# Patient Record
Sex: FEMALE | Race: WHITE | NOT HISPANIC OR LATINO | Employment: UNEMPLOYED | URBAN - METROPOLITAN AREA
[De-identification: names, ages, dates, MRNs, and addresses within clinical notes are randomized per-mention and may not be internally consistent; named-entity substitution may affect disease eponyms.]

---

## 2017-03-22 ENCOUNTER — HOSPITAL ENCOUNTER (EMERGENCY)
Facility: OTHER | Age: 27
Discharge: HOME | End: 2017-03-22
Attending: EMERGENCY MEDICINE
Payer: MEDICAID

## 2017-03-22 VITALS
BODY MASS INDEX: 30.73 KG/M2 | TEMPERATURE: 98 F | OXYGEN SATURATION: 98 % | WEIGHT: 180 LBS | HEIGHT: 64 IN | HEART RATE: 70 BPM | DIASTOLIC BLOOD PRESSURE: 74 MMHG | SYSTOLIC BLOOD PRESSURE: 125 MMHG | RESPIRATION RATE: 18 BRPM
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
## 2017-03-22 DIAGNOSIS — S63.619A FINGER SPRAIN, INITIAL ENCOUNTER: Primary | ICD-10-CM

## 2017-03-22 PROCEDURE — 99283 EMERGENCY DEPT VISIT LOW MDM: CPT | Mod: 25

## 2017-03-22 PROCEDURE — 29130 APPL FINGER SPLINT STATIC: CPT | Mod: F3

## 2017-03-22 RX ORDER — TRAMADOL HYDROCHLORIDE 50 MG/1
50 TABLET ORAL EVERY 6 HOURS PRN
Qty: 12 TABLET | Refills: 0 | Status: SHIPPED | OUTPATIENT
Start: 2017-03-22 | End: 2017-04-01

## 2017-03-22 NOTE — DISCHARGE INSTRUCTIONS
Finger Sprain  A sprain is a stretching or tearing of the ligaments that hold a joint together. There are no broken bones. Sprains take 3 to 6 weeks to heal.    A sprained finger may be treated with a splint or buddy tape. This is when you tape the injured finger to the one next to it for support. Minor sprains may require no additional support.  Home care  · Keep your hand elevated to reduce pain and swelling. This is very important during the first 48 hours.  · Apply an ice pack over the injured area for 15 to 20 minutes every 3 to 6 hours. You should do this for the first 24 to 48 hours. You can make an ice pack by filling a plastic bag that seals at the top with ice cubes and then wrapping it with a thin towel. Continue the use of ice packs for relief of pain and swelling as needed. As the ice melts, be careful to avoid getting any wrap or splint wet. After 48 hours, apply heat (warm shower or warm bath) for 15 to 20 minutes several times a day, or alternate ice and heat.  · If buddy tape was applied and it becomes wet or dirty, change it. You may replace it with paper, plastic or cloth tape. Cloth tape and paper tapes must be kept dry. Apply gauze or cotton padding between the fingers, especially at the webbed space. This will help prevent the skin from getting moist and breaking down. Keep the buddy tape in place for at least 4 weeks, or as instructed by your healthcare provider.  · If a splint was applied, wear it for the time advised.  · You may use over-the-counter pain medicine to control pain, unless another pain medicine was prescribed. If you have chronic liver or kidney disease or ever had a stomach ulcer or GI bleeding, talk with your healthcare provider before using these medicines.  Follow-up care  Follow up with your healthcare provider as directed. Finger joints will become stiff if immobile for too long. If a splint was applied, ask your healthcare provider when it is safe to begin  range-of-motion exercises.  Sometimes fractures dont show up on the first X-ray. Bruises and sprains can sometimes hurt as much as a fracture. These injuries can take time to heal completely. If your symptoms dont improve or they get worse, talk with your healthcare provider. You may need a repeat X-ray. If X-rays were taken, you will be told of any new findings that may affect your care.  When to seek medical advice  Call your healthcare provider right away if any of these occur:  · Pain or swelling increases  · Fingers or hand becomes cold, blue, numb, or tingly  Date Last Reviewed: 11/20/2015  © 0853-0922 [employer]. [address] [place] [address], [place] [address]. All rights reserved. This information is not intended as a substitute for professional medical care. Always follow your healthcare professional's instructions.

## 2017-03-22 NOTE — ED NOTES
Finger splint applied per MD order. Application and removal of device reviewed. Return demonstration provided by patient

## 2017-03-22 NOTE — ED PROVIDER NOTES
Encounter Date: 3/22/2017       History     Chief Complaint   Patient presents with    Hand Pain     left 4th finger injury, slipped in shower. swelling noted at knuckle area     Review of patient's allergies indicates:   Allergen Reactions    Sulfa (sulfonamide antibiotics) Anaphylaxis and Swelling     Patient is a 27 y.o. female presenting with the following complaint: hand injury.   Hand Injury    The incident occurred today. The incident occurred at home. The injury mechanism was a fall and a direct blow. The pain is present in the left fingers. The pain has been constant since the incident. Pertinent negatives include no fever. The symptoms are aggravated by movement, use and palpation. She has tried nothing for the symptoms.   right hand dominant  Past Medical History:   Diagnosis Date    Bipolar disorder     Bronchitis, chronic      Past Surgical History:   Procedure Laterality Date    rectocele       Family History   Problem Relation Age of Onset    Hypertension Mother     Liver disease Father     Breast cancer Neg Hx     Colon cancer Neg Hx     Ovarian cancer Neg Hx      Social History   Substance Use Topics    Smoking status: Former Smoker     Packs/day: 0.50     Years: 4.00     Types: Cigarettes    Smokeless tobacco: None    Alcohol use 0.5 oz/week     1 Standard drinks or equivalent per week      Comment: occasional     Review of Systems   Constitutional: Negative for chills and fever.   HENT: Negative.    Eyes: Negative.    Respiratory: Negative for cough, shortness of breath and stridor.    Cardiovascular: Negative for chest pain and palpitations.   Gastrointestinal: Negative for nausea and vomiting.   Genitourinary: Negative for dysuria and hematuria.   Musculoskeletal: Positive for arthralgias and joint swelling.   Skin: Negative.    Neurological: Negative for dizziness and headaches.   Psychiatric/Behavioral: Negative.    All other systems reviewed and are negative.      Physical Exam    Initial Vitals   BP Pulse Resp Temp SpO2   03/22/17 0018 03/22/17 0018 03/22/17 0018 03/22/17 0018 03/22/17 0018   136/77 74 18 97.6 °F (36.4 °C) 100 %     Physical Exam    Nursing note and vitals reviewed.  Constitutional: She appears well-developed and well-nourished. She is not diaphoretic. No distress.   HENT:   Head: Normocephalic and atraumatic.   Mouth/Throat: Oropharynx is clear and moist. No oropharyngeal exudate.   Eyes: EOM are normal. Pupils are equal, round, and reactive to light.   Neck: Normal range of motion. Neck supple.   Cardiovascular: Normal rate, regular rhythm and intact distal pulses.   No murmur heard.  Pulmonary/Chest: Breath sounds normal. No stridor. No respiratory distress.   Abdominal: Soft. Bowel sounds are normal. There is no tenderness.   Musculoskeletal: Normal range of motion. She exhibits edema and tenderness.        Left hand: She exhibits tenderness, bony tenderness and swelling. Normal sensation noted. Normal strength noted.        Hands:  Neurological: She is alert and oriented to person, place, and time. She has normal strength. No cranial nerve deficit.   Skin: Skin is warm and dry. No erythema. No pallor.   Psychiatric: She has a normal mood and affect.         ED Course   Procedures  Labs Reviewed - No data to display                            ED Course      Imaging Reviewed    Imaging Results         X-Ray Finger 2 or More Views (Final result) Result time:  03/22/17 00:47:53    Final result by Interface, Rad Results In (03/22/17 00:47:53)    Narrative:    Study Desc:   XR FINGER 2 OR MORE VIEWS  Clinical History: Fall, pain 4th prox IP joint  EXAM: Left 4th digit  Xray     IMAGES:   3 views total     COMPARISONS: None     FINDINGS:  There is no fracture or dislocation appreciated.     IMPRESSION:  No acute pathology appreciated.  If ongoing clinical concern, follow up imaging   recommended.     SL: 24 Signed by: Nicolas Sigala MD.  2017-03-22 00:47:50               Medications given in ED    Medications - No data to display    Discharge Medications     Medication List with Changes/Refills   New Medications    TRAMADOL (ULTRAM) 50 MG TABLET    Take 1 tablet (50 mg total) by mouth every 6 (six) hours as needed for Pain (severe pain).   Current Medications    BUTALBITAL-ACETAMINOPHEN-CAFFEINE -40 MG (FIORICET, ESGIC) -40 MG PER TABLET    TAKE 1 TABLET BY MOUTH TWICE DAILY AS NEEDED    MIRENA 20 MCG/24 HR (5 YEARS) IUD        PANTOPRAZOLE (PROTONIX) 40 MG TABLET    Take 1 tablet (40 mg total) by mouth once daily.    PRENATAL VIT#103-IRON-FA () 27 MG IRON- 1 MG TAB    Take 1 tablet by mouth once daily.    QUETIAPINE (SEROQUEL) 25 MG TAB    TAKE 1 TABLET BY MOUTH EVERY DAY    SERTRALINE (ZOLOFT) 50 MG TABLET    TAKE 1 TABLET BY MOUTH EVERY DAY   Discontinued Medications    ONDANSETRON (ZOFRAN-ODT) 4 MG TBDL    Take 1 tablet (4 mg total) by mouth every 8 (eight) hours as needed (nausea/vomiting).    PROMETHAZINE (PHENERGAN) 25 MG SUPPOSITORY    Place 1 suppository (25 mg total) rectally every 6 (six) hours as needed for Nausea.             Patient discharged to home in stable condition with instructions to:   1. Please take all meds as prescribed.  2. Follow-up with your primary care doctor   3. Return precautions discussed and patient and/or family/caretaker understands to return to the emergency room for any concerns including worsening of your current symptoms, fever, chills, night sweats, worsening pain, chest pain, shortness of breath, nausea, vomiting, diarrhea, bleeding, headache, difficulty talking, visual disturbances, weakness, numbness or any other acute concerns    Clinical Impression:   The primary encounter diagnosis was Finger sprain, initial encounter. A diagnosis of Fall was also pertinent to this visit.          Blanca Reyna MD  03/22/17 [id number]

## 2017-03-22 NOTE — ED AVS SNAPSHOT
[facility] EMERGENCY DEPARTMENT  [address] [place] [address]               Marly VALLES Head   3/22/2017  1:17 AM   ED    Description:  Female : 1990   Department:  [facility] Emergency Department           Your Care was Coordinated By:     Provider Role From To    Blanca Reyna MD Attending Provider 17 0128 --      Reason for Visit     Hand Pain           Diagnoses this Visit        Comments    Finger sprain, initial encounter    -  Primary     Fall           ED Disposition     ED Disposition Condition Comment    Discharge  Patient discharged to home in stable condition.              To Do List           Follow-up Information     Follow up with Leesa Mccarty MD.    Specialty:  Family Medicine    Why:  As needed, for ongoing care    Contact information:    [address]  [place] [address]  [phone]         These Medications        Disp Refills Start End    tramadol (ULTRAM) 50 mg tablet 12 tablet 0 3/22/2017 2017    Take 1 tablet (50 mg total) by mouth every 6 (six) hours as needed for Pain (severe pain). - Oral    [facility] [address] [place] [address] EXP AT [facility] Ph #: [phone]         [facility] On Call     [facility] On Call Nurse Care Line -  Assistance  Registered nurses in the [facility] provide clinical advisement, health education, appointment booking, and other advisory services.  Call for this free service at [phone].             Medications           Message regarding Medications     Verify the changes and/or additions to your medication regime listed below are the same as discussed with your clinician today.  If any of these changes or additions are incorrect, please notify your healthcare provider.        START taking these NEW medications        Refills    tramadol (ULTRAM) 50 mg tablet 0    Sig: Take 1 tablet (50 mg total) by mouth every 6 (six) hours as needed for Pain (severe pain).     "Class: Print    Route: Oral      STOP taking these medications     promethazine (PHENERGAN) 25 MG suppository Place 1 suppository (25 mg total) rectally every 6 (six) hours as needed for Nausea.    ondansetron (ZOFRAN-ODT) 4 MG TbDL Take 1 tablet (4 mg total) by mouth every 8 (eight) hours as needed (nausea/vomiting).           Verify that the below list of medications is an accurate representation of the medications you are currently taking.  If none reported, the list may be blank. If incorrect, please contact your healthcare provider. Carry this list with you in case of emergency.           Current Medications     butalbital-acetaminophen-caffeine -40 mg (FIORICET, ESGIC) -40 mg per tablet TAKE 1 TABLET BY MOUTH TWICE DAILY AS NEEDED    MIRENA 20 mcg/24 hr (5 years) IUD     pantoprazole (PROTONIX) 40 MG tablet Take 1 tablet (40 mg total) by mouth once daily.    prenatal vit#103-iron-FA () 27 mg iron- 1 mg Tab Take 1 tablet by mouth once daily.    quetiapine (SEROQUEL) 25 MG Tab TAKE 1 TABLET BY MOUTH EVERY DAY    sertraline (ZOLOFT) 50 MG tablet TAKE 1 TABLET BY MOUTH EVERY DAY    tramadol (ULTRAM) 50 mg tablet Take 1 tablet (50 mg total) by mouth every 6 (six) hours as needed for Pain (severe pain).           Clinical Reference Information           Your Vitals Were     BP Pulse Temp Resp Height Weight    136/77 74 97.6 °F (36.4 °C) (Temporal) 18 5' 4" (1.626 m) 81.6 kg (180 lb)    SpO2 BMI             100% 30.9 kg/m2         Allergies as of 3/22/2017        Reactions    Sulfa (Sulfonamide Antibiotics) Anaphylaxis, Swelling      Immunizations Administered on Date of Encounter - 3/22/2017     None      ED Micro, Lab, POCT     None      ED Imaging Orders     Start Ordered       Status Ordering Provider    03/22/17 0022 03/22/17 0021  X-Ray Finger 2 or More Views  1 time imaging      Final result         Discharge Instructions         Finger Sprain  A sprain is a stretching or tearing of the " ligaments that hold a joint together. There are no broken bones. Sprains take 3 to 6 weeks to heal.    A sprained finger may be treated with a splint or buddy tape. This is when you tape the injured finger to the one next to it for support. Minor sprains may require no additional support.  Home care  · Keep your hand elevated to reduce pain and swelling. This is very important during the first 48 hours.  · Apply an ice pack over the injured area for 15 to 20 minutes every 3 to 6 hours. You should do this for the first 24 to 48 hours. You can make an ice pack by filling a plastic bag that seals at the top with ice cubes and then wrapping it with a thin towel. Continue the use of ice packs for relief of pain and swelling as needed. As the ice melts, be careful to avoid getting any wrap or splint wet. After 48 hours, apply heat (warm shower or warm bath) for 15 to 20 minutes several times a day, or alternate ice and heat.  · If buddy tape was applied and it becomes wet or dirty, change it. You may replace it with paper, plastic or cloth tape. Cloth tape and paper tapes must be kept dry. Apply gauze or cotton padding between the fingers, especially at the webbed space. This will help prevent the skin from getting moist and breaking down. Keep the buddy tape in place for at least 4 weeks, or as instructed by your healthcare provider.  · If a splint was applied, wear it for the time advised.  · You may use over-the-counter pain medicine to control pain, unless another pain medicine was prescribed. If you have chronic liver or kidney disease or ever had a stomach ulcer or GI bleeding, talk with your healthcare provider before using these medicines.  Follow-up care  Follow up with your healthcare provider as directed. Finger joints will become stiff if immobile for too long. If a splint was applied, ask your healthcare provider when it is safe to begin range-of-motion exercises.  Sometimes fractures dont show up on the  first X-ray. Bruises and sprains can sometimes hurt as much as a fracture. These injuries can take time to heal completely. If your symptoms dont improve or they get worse, talk with your healthcare provider. You may need a repeat X-ray. If X-rays were taken, you will be told of any new findings that may affect your care.  When to seek medical advice  Call your healthcare provider right away if any of these occur:  · Pain or swelling increases  · Fingers or hand becomes cold, blue, numb, or tingly  Date Last Reviewed: 11/20/2015  © 0898-0140 [employer]. [address] [place] [address]. All rights reserved. This information is not intended as a substitute for professional medical care. Always follow your healthcare professional's instructions.          [facility] Sign-Up     Activating your [facility] account is as easy as 1-2-3!     1) Visit [email or web address], select Sign Up Now, enter this activation code and your date of birth, then select Next.  [email or web address]  Expires: 5/6/2017  3:03 AM      2) Create a username and password to use when you visit [facility] in the future and select a security question in case you lose your password and select Next.    3) Enter your e-mail address and click Sign Up!    Additional Information  If you have questions, please e-mail [email or web address] or call [phone] to talk to our [facility] staff. Remember, [facility] is NOT to be used for urgent needs. For medical emergencies, dial 911.         Smoking Cessation     If you would like to quit smoking:   You may be eligible for free services if you are a [place] resident and started smoking cigarettes before September 1, 1988.  Call the Smoking Cessation Trust (SCT) toll free at (956) 202-8245 or (800) 541-1095.   Call 9-303-QUIT-NOW if you do not meet the above criteria.             [facility] Emergency Department complies with applicable Federal civil rights laws and does not discriminate on the  basis of race, color, national origin, age, disability, or sex.        Language Assistance Services     ATTENTION: Language assistance services are available, free of charge. Please call [phone].      ATENCIÓN: Si habla baljeet, tiene a wills disposición servicios gratuitos de asistencia lingüística. Llame al [phone].     CHÚ Ý: N?u b?n nói Ti?ng Vi?t, có các d?ch v? h? tr? ngôn ng? mi?n phí dành cho b?n. G?i s? [phone].

## 2017-08-14 ENCOUNTER — TELEPHONE (OUTPATIENT)
Dept: FAMILY MEDICINE | Facility: CLINIC | Age: 27
End: 2017-08-14

## 2017-08-14 NOTE — TELEPHONE ENCOUNTER
----- Message from Renae Delgado sent at 8/11/2017  3:44 PM CDT -----  Contact: Patient  x _1st Request  _  2nd Request  _  3rd Request    Who:HEAD,KATE VALLES [[id number]]    Why:Patient states she was calling to request a prescription for some eye cream she has a [facility] on [address] in [place]     What Number to Call Back:[phone]    When to Expect a call back: (Before the end of the day)   -- if call after 3:00 call back will be tomorrow.

## 2017-10-23 ENCOUNTER — TELEPHONE (OUTPATIENT)
Dept: FAMILY MEDICINE | Facility: CLINIC | Age: 27
End: 2017-10-23

## 2017-12-27 ENCOUNTER — TELEPHONE (OUTPATIENT)
Dept: FAMILY MEDICINE | Facility: CLINIC | Age: 27
End: 2017-12-27

## 2017-12-27 NOTE — TELEPHONE ENCOUNTER
----- Message from Sharon Ramesh sent at 12/26/2017 12:15 PM CST -----  Contact: self  Patient requests to receive a referral for mental health doctor. Patient also requests for MRI orders for breast at [facility] in [place], as her left breast pain from breast implants. She can be reached at [phone]. Thank you!

## 2018-01-01 NOTE — TELEPHONE ENCOUNTER
----- Message from Andie Morel sent at 10/23/2017 10:38 AM CDT -----  Contact: self  Pt calling to see if she can get a script for her steib. Please call [phone].  
----- Message from Andie Morel sent at 10/23/2017 10:38 AM CDT -----  Contact: self  Pt calling to see if she can get a script for her steib. Please call [phone].  
Left message  
Left message to call office.  
Patient advised, said she will call back to schedule appt.  
Patient states that she has a stye and it is really big and she has no way of coming in until next week and that is really bothering her. Requesting a cream or drops. Thank you  
She should use warm compresses.  She can also go to an urgent care to be seen.  Otherwise she needs an office visit.   
28

## 2018-01-09 DIAGNOSIS — F41.9 ANXIETY: ICD-10-CM

## 2018-01-09 RX ORDER — SERTRALINE HYDROCHLORIDE 50 MG/1
50 TABLET, FILM COATED ORAL DAILY
Qty: 30 TABLET | Refills: 0 | Status: SHIPPED | OUTPATIENT
Start: 2018-01-09 | End: 2018-01-16

## 2018-01-09 NOTE — TELEPHONE ENCOUNTER
----- Message from Sharon Ramesh sent at 1/9/2018 12:14 PM CST -----  Refill: sertraline (ZOLOFT) 50 MG tablet    [facility] [address] [place] [address] EXPY AT [facility]     Patient can be reached at [phone]. Thank you!

## 2018-01-16 ENCOUNTER — OFFICE VISIT (OUTPATIENT)
Dept: FAMILY MEDICINE | Facility: CLINIC | Age: 28
End: 2018-01-16
Payer: MEDICAID

## 2018-01-16 VITALS
TEMPERATURE: 99 F | DIASTOLIC BLOOD PRESSURE: 78 MMHG | WEIGHT: 176.13 LBS | SYSTOLIC BLOOD PRESSURE: 110 MMHG | BODY MASS INDEX: 30.07 KG/M2 | HEIGHT: 64 IN | OXYGEN SATURATION: 99 % | HEART RATE: 62 BPM

## 2018-01-16 DIAGNOSIS — F31.4 BIPOLAR DISORDER, CURRENT EPISODE DEPRESSED, SEVERE, WITHOUT PSYCHOTIC FEATURES: ICD-10-CM

## 2018-01-16 DIAGNOSIS — T85.43XA RUPTURED SILICONE BREAST IMPLANT, INITIAL ENCOUNTER: ICD-10-CM

## 2018-01-16 DIAGNOSIS — N64.4 BREAST PAIN: ICD-10-CM

## 2018-01-16 DIAGNOSIS — G43.719 INTRACTABLE CHRONIC MIGRAINE WITHOUT AURA AND WITHOUT STATUS MIGRAINOSUS: Primary | ICD-10-CM

## 2018-01-16 PROCEDURE — 99214 OFFICE O/P EST MOD 30 MIN: CPT | Mod: S$PBB,,, | Performed by: FAMILY MEDICINE

## 2018-01-16 PROCEDURE — 99999 PR PBB SHADOW E&M-EST. PATIENT-LVL IV: CPT | Mod: PBBFAC,,, | Performed by: FAMILY MEDICINE

## 2018-01-16 PROCEDURE — 99214 OFFICE O/P EST MOD 30 MIN: CPT | Mod: PBBFAC,PO | Performed by: FAMILY MEDICINE

## 2018-01-16 RX ORDER — SUMATRIPTAN SUCCINATE 25 MG/1
50 TABLET ORAL EVERY 8 HOURS PRN
Qty: 10 TABLET | Refills: 0 | Status: SHIPPED | OUTPATIENT
Start: 2018-01-16 | End: 2018-10-12

## 2018-01-16 RX ORDER — LEVONORGESTREL AND ETHINYL ESTRADIOL 0.15-0.03
1 KIT ORAL DAILY
COMMUNITY
End: 2018-10-12

## 2018-01-16 NOTE — PROGRESS NOTES
Office Visit    Patient Name: Marly VALLES Head    : 1990  MRN: [id number]    Subjective:  Marly VALLES is a 27 y.o. female who presents today for:    migraines and referral      This patient has multiple medical diagnoses as noted below.  This patient is known to me and to this clinic.   She reports that she is off of seroquel for sleep.  She would like a referral to [facility].        She reports increased breast pain.  Implants placed over a year ago.  Unsure the cause of the pain. No injury to her breast.       Patient Active Problem List   Diagnosis    Tobacco smoking complicating pregnancy    Upper abdominal pain       Past Surgical History:   Procedure Laterality Date    rectocele         Family History   Problem Relation Age of Onset    Hypertension Mother     Liver disease Father     Breast cancer Neg Hx     Colon cancer Neg Hx     Ovarian cancer Neg Hx        Social History     Social History    Marital status: Single     Spouse name: N/A    Number of children: N/A    Years of education: N/A     Occupational History    Not on file.     Social History Main Topics    Smoking status: Former Smoker     Packs/day: 0.50     Years: 4.00     Types: Cigarettes    Smokeless tobacco: Not on file    Alcohol use 0.5 oz/week     1 Standard drinks or equivalent per week      Comment: occasional    Drug use: No    Sexual activity: Yes     Partners: Male     Birth control/ protection: None     Other Topics Concern    Not on file     Social History Narrative    No narrative on file       Current Medications  Medications reviewed and updated.     Allergies   Review of patient's allergies indicates:   Allergen Reactions    Sulfa (sulfonamide antibiotics) Anaphylaxis and Swelling         Labs  No results found for: LABA1C, HGBA1C  Lab Results   Component Value Date     2016    K 4.5 2016     2016    CO2 27 2016    BUN 10 2016    CREATININE 0.7 2016    CALCIUM 9.1  "06/22/2016    ANIONGAP 10 06/22/2016    ESTGFRAFRICA >60 06/22/2016    EGFRNONAA >60 06/22/2016     No results found for: CHOL  No results found for: HDL  No results found for: LDLCALC  No results found for: TRIG  No results found for: CHOLHDL  Last set of blood work has been reviewed as noted above.    Review of Systems   Constitutional: Negative for activity change, appetite change, fatigue, fever and unexpected weight change.   HENT: Negative.  Negative for ear discharge, ear pain, rhinorrhea and sore throat.    Eyes: Negative.    Respiratory: Negative for apnea, cough, chest tightness, shortness of breath and wheezing.    Cardiovascular: Negative for chest pain, palpitations and leg swelling.   Gastrointestinal: Negative for abdominal distention, abdominal pain, constipation, diarrhea and vomiting.   Endocrine: Negative for cold intolerance, heat intolerance, polydipsia and polyuria.   Genitourinary: Negative for decreased urine volume, menstrual problem, urgency, vaginal bleeding, vaginal discharge and vaginal pain.   Musculoskeletal: Negative.    Skin: Negative for rash.   Neurological: Positive for headaches. Negative for dizziness.   Hematological: Does not bruise/bleed easily.   Psychiatric/Behavioral: Positive for agitation, decreased concentration and dysphoric mood. Negative for sleep disturbance and suicidal ideas. The patient is nervous/anxious.        /78 (BP Location: Left arm, Patient Position: Sitting, BP Method: Large (Manual))   Pulse 62   Temp 98.5 °F (36.9 °C) (Oral)   Ht 5' 4" (1.626 m)   Wt 79.9 kg (176 lb 2.4 oz)   SpO2 99%   BMI 30.24 kg/m²      Physical Exam   Constitutional: She is oriented to person, place, and time. She appears well-developed and well-nourished.   HENT:   Head: Normocephalic.   Right Ear: External ear normal.   Left Ear: External ear normal.   Nose: Nose normal.   Mouth/Throat: Oropharynx is clear and moist.   Eyes: Conjunctivae and EOM are normal. Pupils " are equal, round, and reactive to light.   Cardiovascular: Normal rate, regular rhythm and normal heart sounds.    Pulmonary/Chest: Effort normal and breath sounds normal.       Neurological: She is alert and oriented to person, place, and time.   Skin: Skin is warm and dry.   Vitals reviewed.      Health Maintenance  Health Maintenance       Date Due Completion Date    Lipid Panel 1990 ---    TETANUS VACCINE 03/03/2008 ---    Pap Smear 10/28/2014 10/28/2013    Influenza Vaccine 08/01/2017 11/4/2013          Assessment/Plan:  Marly Colmenares is a 27 y.o. female who presents today for :    1. Intractable chronic migraine without aura and without status migrainosus    2. Breast pain    3. Ruptured silicone breast implant, initial encounter    4. Bipolar disorder, current episode depressed, severe, without psychotic features        Problem List Items Addressed This Visit     None      Visit Diagnoses     Intractable chronic migraine without aura and without status migrainosus    -  Primary  -   imitrex sent to pharmacy     Breast pain      -  Will need further evaluation   -  Implant may be dislodged or ruptured       Ruptured silicone breast implant, initial encounter          Relevant Orders    MRI Breast Bilateral    Bipolar disorder, current episode depressed, severe, without psychotic features        Relevant Orders    Ambulatory referral to Psychiatry  -  Referral provided to patient in office           Follow-up in about 4 weeks (around 2/13/2018), or if symptoms worsen or fail to improve.     This note was created by combination of typed  and Dragon dictation.  Transcription errors may be present.  If there are any questions, please contact me.

## 2018-02-05 DIAGNOSIS — F41.9 ANXIETY: ICD-10-CM

## 2018-02-05 RX ORDER — SERTRALINE HYDROCHLORIDE 50 MG/1
TABLET, FILM COATED ORAL
Qty: 30 TABLET | Refills: 0 | Status: SHIPPED | OUTPATIENT
Start: 2018-02-05 | End: 2018-03-04 | Stop reason: SDUPTHER

## 2018-03-04 DIAGNOSIS — F41.9 ANXIETY: ICD-10-CM

## 2018-03-04 RX ORDER — SERTRALINE HYDROCHLORIDE 50 MG/1
TABLET, FILM COATED ORAL
Qty: 30 TABLET | Refills: 3 | Status: SHIPPED | OUTPATIENT
Start: 2018-03-04 | End: 2018-10-12

## 2018-04-01 DIAGNOSIS — F41.9 ANXIETY: ICD-10-CM

## 2018-04-02 RX ORDER — SERTRALINE HYDROCHLORIDE 50 MG/1
TABLET, FILM COATED ORAL
Qty: 30 TABLET | Refills: 0 | Status: SHIPPED | OUTPATIENT
Start: 2018-04-02 | End: 2018-07-24 | Stop reason: SDUPTHER

## 2018-04-02 RX ORDER — QUETIAPINE FUMARATE 25 MG/1
TABLET, FILM COATED ORAL
Qty: 30 TABLET | Refills: 0 | Status: SHIPPED | OUTPATIENT
Start: 2018-04-02 | End: 2018-04-29 | Stop reason: SDUPTHER

## 2018-04-29 DIAGNOSIS — F41.9 ANXIETY: ICD-10-CM

## 2018-04-30 RX ORDER — QUETIAPINE FUMARATE 25 MG/1
TABLET, FILM COATED ORAL
Qty: 30 TABLET | Refills: 0 | Status: SHIPPED | OUTPATIENT
Start: 2018-04-30 | End: 2018-05-29 | Stop reason: SDUPTHER

## 2018-05-29 DIAGNOSIS — F41.9 ANXIETY: ICD-10-CM

## 2018-05-29 RX ORDER — QUETIAPINE FUMARATE 25 MG/1
25 TABLET, FILM COATED ORAL DAILY
Qty: 30 TABLET | Refills: 0 | Status: SHIPPED | OUTPATIENT
Start: 2018-05-29 | End: 2018-06-27 | Stop reason: SDUPTHER

## 2018-06-27 DIAGNOSIS — F41.9 ANXIETY: ICD-10-CM

## 2018-06-27 RX ORDER — QUETIAPINE FUMARATE 25 MG/1
TABLET, FILM COATED ORAL
Qty: 30 TABLET | Refills: 2 | Status: SHIPPED | OUTPATIENT
Start: 2018-06-27 | End: 2018-09-18 | Stop reason: SDUPTHER

## 2018-07-24 DIAGNOSIS — F41.9 ANXIETY: ICD-10-CM

## 2018-07-24 RX ORDER — SERTRALINE HYDROCHLORIDE 50 MG/1
TABLET, FILM COATED ORAL
Qty: 30 TABLET | Refills: 3 | Status: SHIPPED | OUTPATIENT
Start: 2018-07-24 | End: 2018-10-12 | Stop reason: SDUPTHER

## 2018-07-24 NOTE — TELEPHONE ENCOUNTER
----- Message from Lilliana Tamayo sent at 7/24/2018 10:33 AM CDT -----  Contact: Self/ [phone]  Pt requesting a call back from staff to reschedule a later time today. Please call to advise. Thank you.

## 2018-07-24 NOTE — TELEPHONE ENCOUNTER
See message from pt regarding appt for this afternoon. Left message for pt to call appt desk to reschedule her appt if she will not be able to come this morning.

## 2018-07-26 ENCOUNTER — TELEPHONE (OUTPATIENT)
Dept: FAMILY MEDICINE | Facility: CLINIC | Age: 28
End: 2018-07-26

## 2018-07-26 NOTE — TELEPHONE ENCOUNTER
----- Message from Pamela Dozier sent at 7/25/2018  4:53 PM CDT -----  Contact: [phone]/Pt  Calling To speak with nurse regarding appt

## 2018-07-26 NOTE — TELEPHONE ENCOUNTER
Contacted patient, informed her we do not have any earlier appt. To see Dr. Gupta. Pt. Declined to see another provider. Pt. Kept appt scheduled for August.

## 2018-08-31 ENCOUNTER — TELEPHONE (OUTPATIENT)
Dept: FAMILY MEDICINE | Facility: CLINIC | Age: 28
End: 2018-08-31

## 2018-08-31 NOTE — TELEPHONE ENCOUNTER
----- Message from Memo Sena sent at 8/31/2018  9:36 AM CDT -----  Contact: Self/[phone]  The patient is requesting to speak to someone in regards to an earlier appointment. The patient is stating she's very depressed due to her mother passing away on Monday.    Thank you

## 2018-09-04 ENCOUNTER — OFFICE VISIT (OUTPATIENT)
Dept: FAMILY MEDICINE | Facility: CLINIC | Age: 28
End: 2018-09-04
Payer: COMMERCIAL

## 2018-09-04 VITALS
SYSTOLIC BLOOD PRESSURE: 110 MMHG | WEIGHT: 185.88 LBS | HEART RATE: 84 BPM | OXYGEN SATURATION: 99 % | BODY MASS INDEX: 31.73 KG/M2 | DIASTOLIC BLOOD PRESSURE: 80 MMHG | HEIGHT: 64 IN | TEMPERATURE: 99 F

## 2018-09-04 DIAGNOSIS — F43.0 ACUTE STRESS REACTION: Primary | ICD-10-CM

## 2018-09-04 PROCEDURE — 99215 OFFICE O/P EST HI 40 MIN: CPT | Mod: S$GLB,,, | Performed by: FAMILY MEDICINE

## 2018-09-04 PROCEDURE — 99213 OFFICE O/P EST LOW 20 MIN: CPT | Mod: PBBFAC,PO | Performed by: FAMILY MEDICINE

## 2018-09-04 PROCEDURE — 99999 PR PBB SHADOW E&M-EST. PATIENT-LVL III: CPT | Mod: PBBFAC,,, | Performed by: FAMILY MEDICINE

## 2018-09-04 RX ORDER — DIAZEPAM 5 MG/1
5 TABLET ORAL EVERY 8 HOURS PRN
Qty: 30 TABLET | Refills: 0 | Status: SHIPPED | OUTPATIENT
Start: 2018-09-04 | End: 2018-11-13

## 2018-09-13 ENCOUNTER — TELEPHONE (OUTPATIENT)
Dept: FAMILY MEDICINE | Facility: CLINIC | Age: 28
End: 2018-09-13

## 2018-09-13 NOTE — TELEPHONE ENCOUNTER
Patient advised medication refilled on 09/04/18 too soon for a refill. Patient verbalized her understanding.

## 2018-09-13 NOTE — TELEPHONE ENCOUNTER
----- Message from Anahi Peters sent at 9/13/2018 10:33 AM CDT -----  Contact: Self   Patient says she need a refill on her medication.  Please call at [phone]      diazePAM (VALIUM) 5 MG tablet      [facility] [id number] [place] [address] - [address] [facility] [address] AT [facility]

## 2018-09-13 NOTE — PROGRESS NOTES
Assessment & Plan  Problem List Items Addressed This Visit     None      Visit Diagnoses     Acute stress reaction    -  Primary    Relevant Medications    diazePAM (VALIUM) 5 MG tablet            Health Maintenance reviewed    Follow-up: No Follow-up on file.    ______________________________________________________________________    Chief Complaint  Chief Complaint   Patient presents with    Depression       HPI  Marly Mayfield is a 28 y.o. female with multiple medical diagnoses as listed in the medical history and problem list that presents for depression.  Pt is known to me with last appointment 1/16/2018.      Patient denies any new symptoms including chest pain, SOB, blurry vision, N/V, diarrhea.  Patient he recently lost her mother.  She is not handling the [place] well.  She is having difficulty sleeping.  She is easily distressed at this time.      PAST MEDICAL HISTORY:  Past Medical History:   Diagnosis Date    Bipolar disorder     Bronchitis, chronic        PAST SURGICAL HISTORY:  Past Surgical History:   Procedure Laterality Date    rectocele         SOCIAL HISTORY:  Social History     Socioeconomic History    Marital status:      Spouse name: Not on file    Number of children: Not on file    Years of education: Not on file    Highest education level: Not on file   Social Needs    Financial resource strain: Not on file    Food insecurity - worry: Not on file    Food insecurity - inability: Not on file    Transportation needs - medical: Not on file    Transportation needs - non-medical: Not on file   Occupational History    Not on file   Tobacco Use    Smoking status: Former Smoker     Packs/day: 0.50     Years: 4.00     Pack years: 2.00     Types: Cigarettes   Substance and Sexual Activity    Alcohol use: Yes     Alcohol/week: 0.5 oz     Types: 1 Standard drinks or equivalent per week     Comment: occasional    Drug use: No    Sexual activity: Yes     Partners: Male     Birth  control/protection: None   Other Topics Concern    Not on file   Social History Narrative    Not on file       FAMILY HISTORY:  Family History   Problem Relation Age of Onset    Hypertension Mother     Liver disease Father     Breast cancer Neg Hx     Colon cancer Neg Hx     Ovarian cancer Neg Hx        ALLERGIES AND MEDICATIONS: updated and reviewed.  Review of patient's allergies indicates:   Allergen Reactions    Sulfa (sulfonamide antibiotics) Anaphylaxis and Swelling     Current Outpatient Medications   Medication Sig Dispense Refill    QUEtiapine (SEROQUEL) 25 MG Tab TAKE 1 TABLET(25 MG) BY MOUTH EVERY DAY 30 tablet 2    diazePAM (VALIUM) 5 MG tablet Take 1 tablet (5 mg total) by mouth every 8 (eight) hours as needed for Anxiety or Insomnia. 30 tablet 0    fluconazole (DIFLUCAN) 150 MG Tab   0    levonorgestrel-ethinyl estradiol (NORDETTE) 0.15-0.03 mg per tablet Take 1 tablet by mouth once daily.      metronidazole (FLAGYL) 500 MG tablet TK 1 T PO BID  0    MICROGESTIN FE 1/20, 28, 1 mg-20 mcg (21)/75 mg (7) per tablet TK 1 T PO  ONCE D UTD  0    MIRENA 20 mcg/24 hr (5 years) IUD   0    pantoprazole (PROTONIX) 40 MG tablet Take 1 tablet (40 mg total) by mouth once daily. 30 tablet 0    prenatal vit#103-iron-FA () 27 mg iron- 1 mg Tab Take 1 tablet by mouth once daily. 30 tablet 11    sertraline (ZOLOFT) 50 MG tablet TAKE 1 TABLET(50 MG) BY MOUTH EVERY DAY 30 tablet 3    sertraline (ZOLOFT) 50 MG tablet TAKE 1 TABLET BY MOUTH EVERY DAY 30 tablet 3    sumatriptan (IMITREX) 25 MG Tab Take 2 tablets (50 mg total) by mouth every 8 (eight) hours as needed (headache). 10 tablet 0     No current facility-administered medications for this visit.          ROS  Review of Systems   Constitutional: Negative for activity change and unexpected weight change.   HENT: Negative for hearing loss, rhinorrhea and trouble swallowing.    Eyes: Negative for discharge and visual disturbance.   Respiratory:  "Negative for chest tightness and wheezing.    Cardiovascular: Negative for chest pain and palpitations.   Gastrointestinal: Negative for blood in stool, constipation, diarrhea and vomiting.   Endocrine: Negative for polydipsia and polyuria.   Genitourinary: Negative for difficulty urinating, dysuria, hematuria and menstrual problem.   Musculoskeletal: Negative for arthralgias, joint swelling and neck pain.   Neurological: Positive for headaches. Negative for weakness.   Psychiatric/Behavioral: Positive for dysphoric mood. Negative for confusion.           Physical Exam  Vitals:    09/04/18 1515   BP: 110/80   BP Location: Left arm   Patient Position: Sitting   BP Method: Large (Manual)   Pulse: 84   Temp: 98.5 °F (36.9 °C)   TempSrc: Oral   SpO2: 99%   Weight: 84.3 kg (185 lb 13.6 oz)   Height: 5' 4" (1.626 m)    Body mass index is 31.9 kg/m².  Weight: 84.3 kg (185 lb 13.6 oz)   Height: 5' 4" (162.6 cm)   Physical Exam   Constitutional: She is oriented to person, place, and time. She appears well-developed and well-nourished.   HENT:   Head: Normocephalic.   Right Ear: External ear normal.   Left Ear: External ear normal.   Nose: Nose normal.   Mouth/Throat: Oropharynx is clear and moist.   Eyes: Conjunctivae and EOM are normal. Pupils are equal, round, and reactive to light.   Cardiovascular: Normal rate, regular rhythm and normal heart sounds.   Pulmonary/Chest: Effort normal and breath sounds normal.   Neurological: She is alert and oriented to person, place, and time.   Skin: Skin is warm and dry.   Vitals reviewed.        Health Maintenance       Date Due Completion Date    Lipid Panel 1990 ---    TETANUS VACCINE 03/03/2008 ---    Pap Smear 10/28/2014 10/28/2013    Influenza Vaccine 08/01/2018 11/4/2013              "

## 2018-09-18 DIAGNOSIS — F41.9 ANXIETY: ICD-10-CM

## 2018-09-18 RX ORDER — QUETIAPINE FUMARATE 25 MG/1
TABLET, FILM COATED ORAL
Qty: 30 TABLET | Refills: 2 | Status: SHIPPED | OUTPATIENT
Start: 2018-09-18 | End: 2018-10-26

## 2018-09-23 DIAGNOSIS — F43.0 ACUTE STRESS REACTION: ICD-10-CM

## 2018-09-24 RX ORDER — DIAZEPAM 5 MG/1
TABLET ORAL
Qty: 30 TABLET | Refills: 0 | OUTPATIENT
Start: 2018-09-24

## 2018-09-27 DIAGNOSIS — F43.0 ACUTE STRESS REACTION: ICD-10-CM

## 2018-09-27 RX ORDER — DIAZEPAM 5 MG/1
5 TABLET ORAL EVERY 8 HOURS PRN
Qty: 30 TABLET | Refills: 0 | OUTPATIENT
Start: 2018-09-27 | End: 2018-10-27

## 2018-09-27 NOTE — TELEPHONE ENCOUNTER
----- Message from Analy Bolton sent at 9/27/2018 11:00 AM CDT -----  Contact: Self   Refill : diazePAM (VALIUM) 5 MG tablet      Please call when ready [phone]

## 2018-10-11 ENCOUNTER — TELEPHONE (OUTPATIENT)
Dept: FAMILY MEDICINE | Facility: CLINIC | Age: 28
End: 2018-10-11

## 2018-10-11 NOTE — TELEPHONE ENCOUNTER
Patient scheduled appointment with Dr. Gupta for anxiety. Patient is currently seeing Dr. Shrape for anxiety. Patient was told she can not switch Dr's back and forth for anxiety medication. Patient needs to est with a Dr as the meds she's requesting are controlled. Patient states she just needs something to help her cope with the death of her mother. I informed patient I would send a refill request to Dr. Sharpe.     Patient also scheduled with Dr. Sharpe for 10/31/18.

## 2018-10-12 ENCOUNTER — OFFICE VISIT (OUTPATIENT)
Dept: FAMILY MEDICINE | Facility: CLINIC | Age: 28
End: 2018-10-12
Payer: COMMERCIAL

## 2018-10-12 ENCOUNTER — TELEPHONE (OUTPATIENT)
Dept: FAMILY MEDICINE | Facility: CLINIC | Age: 28
End: 2018-10-12

## 2018-10-12 VITALS
RESPIRATION RATE: 16 BRPM | OXYGEN SATURATION: 98 % | TEMPERATURE: 98 F | SYSTOLIC BLOOD PRESSURE: 124 MMHG | HEART RATE: 86 BPM | BODY MASS INDEX: 32.63 KG/M2 | WEIGHT: 191.13 LBS | DIASTOLIC BLOOD PRESSURE: 80 MMHG | HEIGHT: 64 IN

## 2018-10-12 DIAGNOSIS — G43.009 MIGRAINE WITHOUT AURA AND WITHOUT STATUS MIGRAINOSUS, NOT INTRACTABLE: ICD-10-CM

## 2018-10-12 DIAGNOSIS — F31.9 BIPOLAR DEPRESSION: Primary | ICD-10-CM

## 2018-10-12 PROCEDURE — 99214 OFFICE O/P EST MOD 30 MIN: CPT | Mod: S$GLB,,, | Performed by: FAMILY MEDICINE

## 2018-10-12 PROCEDURE — 3008F BODY MASS INDEX DOCD: CPT | Mod: CPTII,S$GLB,, | Performed by: FAMILY MEDICINE

## 2018-10-12 PROCEDURE — 99999 PR PBB SHADOW E&M-EST. PATIENT-LVL IV: CPT | Mod: PBBFAC,,, | Performed by: FAMILY MEDICINE

## 2018-10-12 RX ORDER — LAMOTRIGINE 25 MG/1
TABLET ORAL
Qty: 126 TABLET | Refills: 0 | Status: SHIPPED | OUTPATIENT
Start: 2018-10-12 | End: 2018-10-26 | Stop reason: SDUPTHER

## 2018-10-12 NOTE — PROGRESS NOTES
Chief Complaint   Patient presents with    Depression       HPI    Marly Mayfield is 28 y.o. female. The primary encounter diagnosis was Bipolar depression. A diagnosis of Migraine without aura and without status migrainosus, not intractable was also pertinent to this visit.     28 year old female with Migraine Headaches comes to clinic with reports of worsening depression and anxiety.  Patient reports a personal history of  Bipolar Disorder and Anxiety.  She has not care under a Psychiatrist or Therapist due to insurance.    She has been on Seroquel for sleep and Zoloft without improvement.  She reports death of her mother about 1 month ago.  She reports prior to her mother's death experiencing frequent crying episodes, irritability, mood swings, and sometimes aggressive behaviors that caused her distress.  She reports since her mother's death these symptoms have worsened. Patient reports using Valium only as needed to numb the worse of depression symptoms.  Patient notes that Lithium helped her father who had similar mental health issues.  She notes being on Lexapro as an adolescent     Patient also reports Migraine headache pain daily.  She reports these headaches occur every 3 days.  She reports that her headache pain resolves after 2 days.  She denies taking medications for pain relief.    Review of Systems   Constitutional: Positive for unexpected weight change. Negative for activity change.   HENT: Negative for hearing loss, rhinorrhea and trouble swallowing.    Eyes: Negative for discharge and visual disturbance.   Respiratory: Negative for chest tightness and wheezing.    Cardiovascular: Negative for chest pain and palpitations.   Gastrointestinal: Positive for vomiting. Negative for blood in stool, constipation and diarrhea.   Endocrine: Negative for polydipsia and polyuria.   Genitourinary: Negative for difficulty urinating, dysuria, hematuria and menstrual problem.   Musculoskeletal: Negative for  "arthralgias, joint swelling and neck pain.   Neurological: Positive for headaches. Negative for weakness.   Psychiatric/Behavioral: Positive for dysphoric mood. Negative for confusion.           Current Outpatient Medications:     diazePAM (VALIUM) 5 MG tablet, Take 1 tablet (5 mg total) by mouth every 8 (eight) hours as needed for Anxiety or Insomnia., Disp: 30 tablet, Rfl: 0    lamoTRIgine (LAMICTAL) 25 MG tablet, Take 1 tablet (25 mg total) by mouth once daily for 14 days, THEN 2 tablets (50 mg total) once daily for 14 days, THEN 4 tablets (100 mg total) once daily for 7 days, THEN 8 tablets (200 mg total) once daily for 7 days., Disp: 126 tablet, Rfl: 0    propranolol (INNOPRAN XL) 80 mg 24 hr capsule, Take 1 capsule (80 mg total) by mouth nightly., Disp: 90 capsule, Rfl: 1    QUEtiapine (SEROQUEL) 25 MG Tab, TAKE 1 TABLET(25 MG) BY MOUTH EVERY DAY, Disp: 30 tablet, Rfl: 2      Blood pressure 124/80, pulse 86, temperature 97.8 °F (36.6 °C), temperature source Oral, resp. rate 16, height 5' 4" (1.626 m), weight 86.7 kg (191 lb 2.2 oz), SpO2 98 %.    Physical Exam   Constitutional: Vital signs are normal. She appears well-developed and well-nourished. She does not appear ill. No distress.   Psychiatric: Her speech is normal. Judgment normal. Her affect is labile. She is not agitated, not slowed and not withdrawn. Thought content is not paranoid and not delusional. Cognition and memory are normal. She exhibits a depressed mood. She is attentive.       No visits with results within 3 Month(s) from this visit.   Latest known visit with results is:   Admission on 06/22/2016, Discharged on 06/22/2016   Component Date Value Ref Range Status    WBC 06/22/2016 5.94  3.90 - 12.70 K/uL Final    RBC 06/22/2016 4.08  4.00 - 5.40 M/uL Final    Hemoglobin 06/22/2016 12.9  12.0 - 16.0 g/dL Final    Hematocrit 06/22/2016 39.8  37.0 - 48.5 % Final    MCV 06/22/2016 98  82 - 98 fL Final    [facility] 06/22/2016 31.6* 27.0 - 31.0 " pg Final    MCHC 06/22/2016 32.4  32.0 - 36.0 % Final    RDW 06/22/2016 12.1  11.5 - 14.5 % Final    Platelets 06/22/2016 258  150 - 350 K/uL Final    MPV 06/22/2016 10.6  9.2 - 12.9 fL Final    Gran # (ANC) 06/22/2016 3.0  1.8 - 7.7 K/uL Final    Lymph # 06/22/2016 2.2  1.0 - 4.8 K/uL Final    Mono # 06/22/2016 0.6  0.3 - 1.0 K/uL Final    Eos # 06/22/2016 0.1  0.0 - 0.5 K/uL Final    Baso # 06/22/2016 0.06  0.00 - 0.20 K/uL Final    Gran% 06/22/2016 50.9  38.0 - 73.0 % Final    Lymph% 06/22/2016 37.2  18.0 - 48.0 % Final    Mono% 06/22/2016 9.6  4.0 - 15.0 % Final    Eosinophil% 06/22/2016 1.3  0.0 - 8.0 % Final    Basophil% 06/22/2016 1.0  0.0 - 1.9 % Final    Differential Method 06/22/2016 Automated   Final    Sodium 06/22/2016 142  136 - 145 mmol/L Final    Potassium 06/22/2016 4.5  3.5 - 5.1 mmol/L Final    Chloride 06/22/2016 105  95 - 110 mmol/L Final    CO2 06/22/2016 27  23 - 29 mmol/L Final    Glucose 06/22/2016 84  70 - 110 mg/dL Final    BUN, Bld 06/22/2016 10  6 - 20 mg/dL Final    Creatinine 06/22/2016 0.7  0.5 - 1.4 mg/dL Final    Calcium 06/22/2016 9.1  8.7 - 10.5 mg/dL Final    Total Protein 06/22/2016 7.1  6.0 - 8.4 g/dL Final    Albumin 06/22/2016 4.1  3.5 - 5.2 g/dL Final    Total Bilirubin 06/22/2016 0.2  0.1 - 1.0 mg/dL Final    Alkaline Phosphatase 06/22/2016 74  55 - 135 U/L Final    AST 06/22/2016 18  10 - 40 U/L Final    ALT 06/22/2016 19  10 - 44 U/L Final    Anion Gap 06/22/2016 10  8 - 16 mmol/L Final    eGFR if African American 06/22/2016 >60  >60 mL/min/1.73 m^2 Final    eGFR if non African American 06/22/2016 >60  >60 mL/min/1.73 m^2 Final    Lipase 06/22/2016 16  4 - 60 U/L Final    POC Preg Test, Ur 06/22/2016 Negative  Negative Final     Acceptable 06/22/2016 Yes   Final    Specimen UA 06/22/2016 Urine, Clean Catch   Final    Color, UA 06/22/2016 Yellow  Yellow, Straw, Radha Final    Appearance, UA 06/22/2016 Clear  Clear Final     pH, UA 06/22/2016 7.0  5.0 - 8.0 Final    Specific Gravity, UA 06/22/2016 1.015  1.005 - 1.030 Final    Protein, UA 06/22/2016 Negative  Negative Final    Glucose, UA 06/22/2016 Negative  Negative Final    Ketones, UA 06/22/2016 Negative  Negative Final    Bilirubin (UA) 06/22/2016 Negative  Negative Final    Occult Blood UA 06/22/2016 Negative  Negative Final    Nitrite, UA 06/22/2016 Negative  Negative Final    Urobilinogen, UA 06/22/2016 Negative  <2.0 EU/dL Final    Leukocytes, UA 06/22/2016 Negative  Negative Final    Amylase 06/22/2016 46  20 - 110 U/L Final   ]    Assessment:    1. Bipolar depression    2. Migraine without aura and without status migrainosus, not intractable          Marly VALLES was seen today for depression.    Diagnoses and all orders for this visit:    Bipolar depression  -     lamoTRIgine (LAMICTAL) 25 MG tablet; Take 1 tablet (25 mg total) by mouth once daily for 14 days, THEN 2 tablets (50 mg total) once daily for 14 days, THEN 4 tablets (100 mg total) once daily for 7 days, THEN 8 tablets (200 mg total) once daily for 7 days.  -     Ambulatory referral to Psychiatry  - New problem. Patient started on Lamictal.  Patient counseled on risks including birth defects.  - Patient referred to Psychiatry.    Migraine without aura and without status migrainosus, not intractable  -     propranolol (INNOPRAN XL) 80 mg 24 hr capsule; Take 1 capsule (80 mg total) by mouth nightly.  - New problem.  Patient counseled that commonly used medications for headache treatment may interact with prescribed mood medications.          FOLLOW UP: Follow-up in about 2 weeks (around 10/26/2018) for Follow up.

## 2018-10-12 NOTE — TELEPHONE ENCOUNTER
Patient is not seeing me for anxiety. Patient is scheduled today to see Dr. Gupta.  Patient is insisting on seeing Dr. Gupta.   She has scheduled to see Dr. Gupta and it is her intent to establish with her. Her appointments on 7/24 and 8/29 were to establish care.  She is not Doctor shopping.  She is not hopping from clinic to clinic.  She was not seeing me for anxiety.  I addressed an acute stress event with the patient.  Her mother has passed.  I did provide a short supply of diazepam.  Patient was notified in the office this is not to be used for coping.  Please see epic chart for the multiple attempts to establish with Dr. Gupta and is subsequently rescheduled by Dr. Gupta's MA.

## 2018-10-12 NOTE — PATIENT INSTRUCTIONS
Understanding Bipolar Disorder  Bipolar disorder is a serious disorder of the brain. It may severely disrupt your life. At times, it may cause you and your loved ones great pain. But there is hope. Although there is no cure, treatment can help control your symptoms. Talk with your healthcare provider or a mental health professional. He or she can offer guidance and support.    What causes bipolar disorder?  The exact causes of bipolar disorder arent known. It is known that the disease runs in families. Genes that affect nerve cells in the brain may be inherited, but as yet these genes have not been found.  Who does it affect?  Over 5 million adults in this country have bipolar disorder. Most often, it strikes young adults. It can affect children and older adults as well. Bipolar disorder affects both men and women. It can strike people of all races, cultures, and incomes.  Ups and downs  Bipolar disorder used to be called manic-depressive illness. That is because it causes extreme mood swings. At times the person may feel almost too happy. These times are often followed by great despair. In some cases, both extremes may occur at once. More often, moods shift back and forth. These mood swings may occur just once in a while. Or they may happen 4 or more times a year. Without treatment, they will likely recur throughout life.  Manic episodes  During manic episodes of bipolar disorder, you feel like youre on top of the world. Even the worst news cant bring you down. Youll likely feel as if you can do anything. And sometimes you may try. You may take great risks, thinking you cant be hurt. You may also talk too fast, and your thoughts may race. You may go for days without sleeping. And you might be very active and do a lot of things in a short time. Manic episodes often end in a depression.  Depressive episodes  In depressive episodes, you feel intense sadness and depression. You may also feel worthless, tired,  and helpless. Even the things you value most dont give you pleasure. At times you may want to die. You may even think about taking your own life.  Date Last Reviewed: 2/1/2017  © 1317-9567 [employer]. [address] [place] [address], [place] [address]. All rights reserved. This information is not intended as a substitute for professional medical care. Always follow your healthcare professional's instructions.

## 2018-10-12 NOTE — TELEPHONE ENCOUNTER
Called pt's pharmacy to find out which med is covered since Propranolol isn't. I was told by Ena at [facility]'s that pharmacy doesn't have this information.      Dr Gupta, can you order something different?

## 2018-10-12 NOTE — TELEPHONE ENCOUNTER
Please request PA.  Patient on Lamictal which can interact with usual medications prescribed for migraine. I cannot recommend a medication for her migraines at this time.  We will discuss this further at her follow up if the PA does not go through.

## 2018-10-26 ENCOUNTER — OFFICE VISIT (OUTPATIENT)
Dept: FAMILY MEDICINE | Facility: CLINIC | Age: 28
End: 2018-10-26
Payer: COMMERCIAL

## 2018-10-26 ENCOUNTER — PATIENT MESSAGE (OUTPATIENT)
Dept: FAMILY MEDICINE | Facility: CLINIC | Age: 28
End: 2018-10-26

## 2018-10-26 VITALS
BODY MASS INDEX: 32.18 KG/M2 | WEIGHT: 188.5 LBS | HEART RATE: 98 BPM | SYSTOLIC BLOOD PRESSURE: 114 MMHG | DIASTOLIC BLOOD PRESSURE: 81 MMHG | HEIGHT: 64 IN | TEMPERATURE: 98 F | OXYGEN SATURATION: 98 %

## 2018-10-26 DIAGNOSIS — F31.9 BIPOLAR DEPRESSION: ICD-10-CM

## 2018-10-26 DIAGNOSIS — F51.02 INSOMNIA DUE TO PSYCHOLOGICAL STRESS: ICD-10-CM

## 2018-10-26 DIAGNOSIS — G43.009 MIGRAINE WITHOUT AURA AND WITHOUT STATUS MIGRAINOSUS, NOT INTRACTABLE: ICD-10-CM

## 2018-10-26 DIAGNOSIS — F31.9 BIPOLAR DEPRESSION: Primary | ICD-10-CM

## 2018-10-26 PROCEDURE — 99214 OFFICE O/P EST MOD 30 MIN: CPT | Mod: S$GLB,,, | Performed by: FAMILY MEDICINE

## 2018-10-26 PROCEDURE — 99999 PR PBB SHADOW E&M-EST. PATIENT-LVL III: CPT | Mod: PBBFAC,,, | Performed by: FAMILY MEDICINE

## 2018-10-26 PROCEDURE — 3008F BODY MASS INDEX DOCD: CPT | Mod: CPTII,S$GLB,, | Performed by: FAMILY MEDICINE

## 2018-10-26 RX ORDER — QUETIAPINE FUMARATE 50 MG/1
50 TABLET, FILM COATED ORAL NIGHTLY PRN
Qty: 90 TABLET | Refills: 1 | Status: SHIPPED | OUTPATIENT
Start: 2018-10-26 | End: 2018-10-26 | Stop reason: SDUPTHER

## 2018-10-26 RX ORDER — QUETIAPINE FUMARATE 50 MG/1
50 TABLET, FILM COATED ORAL NIGHTLY PRN
Qty: 90 TABLET | Refills: 1 | Status: SHIPPED | OUTPATIENT
Start: 2018-10-26 | End: 2018-12-21 | Stop reason: SDUPTHER

## 2018-10-26 RX ORDER — LAMOTRIGINE 25 MG/1
TABLET ORAL
Qty: 90 TABLET | Refills: 0 | Status: SHIPPED | OUTPATIENT
Start: 2018-10-26 | End: 2018-11-13 | Stop reason: DRUGHIGH

## 2018-10-26 RX ORDER — SUMATRIPTAN 50 MG/1
TABLET, FILM COATED ORAL
Qty: 20 TABLET | Refills: 2 | Status: SHIPPED | OUTPATIENT
Start: 2018-10-26 | End: 2018-11-13

## 2018-10-26 NOTE — PROGRESS NOTES
Chief Complaint   Patient presents with    Follow-up       HPI    Marly Mayfield is 28 y.o. female. The primary encounter diagnosis was Bipolar depression. Diagnoses of Migraine without aura and without status migrainosus, not intractable and Insomnia due to psychological stress were also pertinent to this visit.    28 year old female with Bipolar depression comes to clinic for follow up.  Patient reports since last office visit she does not note any difference in her mood.  She reports an incident of several days prior after becoming angry with a neighbor.  Patient does this note that this event was incited by the neighbor criticizing her about the length of time she has stopped grieving.     Patient notes that she continues to have crying episodes related to her mother's death.  She does not describe any other periods of anger or irritability.  She continues to have some difficulty with sleep and headaches.      She has not been able to schedule an appointment with the Psychiatry department or with a therapist.    Review of Systems   Constitutional: Negative for activity change, appetite change, chills, diaphoresis, fatigue and fever.   Respiratory: Negative for shortness of breath.    Cardiovascular: Negative for chest pain.   Musculoskeletal: Negative for gait problem.   Neurological: Positive for headaches. Negative for dizziness, tremors, facial asymmetry, weakness, light-headedness and numbness.   Psychiatric/Behavioral: Positive for dysphoric mood and sleep disturbance. Negative for agitation, behavioral problems, decreased concentration and suicidal ideas. The patient is not nervous/anxious.            Current Outpatient Medications:     diazePAM (VALIUM) 5 MG tablet, Take 1 tablet (5 mg total) by mouth every 8 (eight) hours as needed for Anxiety or Insomnia., Disp: 30 tablet, Rfl: 0    lamoTRIgine (LAMICTAL) 25 MG tablet, Take 1 tablet (25 mg total) by mouth once daily for 14 days, THEN 2 tablets (50 mg  "total) once daily for 14 days, THEN 4 tablets (100 mg total) once daily for 7 days, THEN 8 tablets (200 mg total) once daily for 7 days., Disp: 126 tablet, Rfl: 0    propranolol (INNOPRAN XL) 80 mg 24 hr capsule, Take 1 capsule (80 mg total) by mouth nightly., Disp: 90 capsule, Rfl: 1    QUEtiapine (SEROQUEL) 50 MG tablet, Take 1 tablet (50 mg total) by mouth nightly as needed., Disp: 90 tablet, Rfl: 1    sumatriptan (IMITREX) 50 MG tablet, 1 tablet by mouth every 2 hours as needed for severe headache pain. Do not exceed 4 doses in a 24 hour period., Disp: 20 tablet, Rfl: 2      Blood pressure 114/81, pulse 98, temperature 98.4 °F (36.9 °C), temperature source Oral, height 5' 4" (1.626 m), weight 85.5 kg (188 lb 7.9 oz), SpO2 98 %.    Physical Exam   Constitutional: Vital signs are normal. She appears well-developed and well-nourished. She does not appear ill. No distress.   Psychiatric: Her mood appears not anxious. Her affect is not blunt, not labile and not inappropriate. Her speech is not rapid and/or pressured. She is not aggressive, not slowed and not withdrawn. Thought content is not paranoid and not delusional. Cognition and memory are not impaired. She does not express impulsivity. She exhibits a depressed mood.       No visits with results within 3 Month(s) from this visit.   Latest known visit with results is:   Admission on 06/22/2016, Discharged on 06/22/2016   Component Date Value Ref Range Status    WBC 06/22/2016 5.94  3.90 - 12.70 K/uL Final    RBC 06/22/2016 4.08  4.00 - 5.40 M/uL Final    Hemoglobin 06/22/2016 12.9  12.0 - 16.0 g/dL Final    Hematocrit 06/22/2016 39.8  37.0 - 48.5 % Final    MCV 06/22/2016 98  82 - 98 fL Final    MCH 06/22/2016 31.6* 27.0 - 31.0 pg Final    MCHC 06/22/2016 32.4  32.0 - 36.0 % Final    RDW 06/22/2016 12.1  11.5 - 14.5 % Final    Platelets 06/22/2016 258  150 - 350 K/uL Final    MPV 06/22/2016 10.6  9.2 - 12.9 fL Final    Gran # (ANC) 06/22/2016 3.0  " 1.8 - 7.7 K/uL Final    Lymph # 06/22/2016 2.2  1.0 - 4.8 K/uL Final    Mono # 06/22/2016 0.6  0.3 - 1.0 K/uL Final    Eos # 06/22/2016 0.1  0.0 - 0.5 K/uL Final    Baso # 06/22/2016 0.06  0.00 - 0.20 K/uL Final    Gran% 06/22/2016 50.9  38.0 - 73.0 % Final    Lymph% 06/22/2016 37.2  18.0 - 48.0 % Final    Mono% 06/22/2016 9.6  4.0 - 15.0 % Final    Eosinophil% 06/22/2016 1.3  0.0 - 8.0 % Final    Basophil% 06/22/2016 1.0  0.0 - 1.9 % Final    Differential Method 06/22/2016 Automated   Final    Sodium 06/22/2016 142  136 - 145 mmol/L Final    Potassium 06/22/2016 4.5  3.5 - 5.1 mmol/L Final    Chloride 06/22/2016 105  95 - 110 mmol/L Final    CO2 06/22/2016 27  23 - 29 mmol/L Final    Glucose 06/22/2016 84  70 - 110 mg/dL Final    BUN, Bld 06/22/2016 10  6 - 20 mg/dL Final    Creatinine 06/22/2016 0.7  0.5 - 1.4 mg/dL Final    Calcium 06/22/2016 9.1  8.7 - 10.5 mg/dL Final    Total Protein 06/22/2016 7.1  6.0 - 8.4 g/dL Final    Albumin 06/22/2016 4.1  3.5 - 5.2 g/dL Final    Total Bilirubin 06/22/2016 0.2  0.1 - 1.0 mg/dL Final    Alkaline Phosphatase 06/22/2016 74  55 - 135 U/L Final    AST 06/22/2016 18  10 - 40 U/L Final    ALT 06/22/2016 19  10 - 44 U/L Final    Anion Gap 06/22/2016 10  8 - 16 mmol/L Final    eGFR if African American 06/22/2016 >60  >60 mL/min/1.73 m^2 Final    eGFR if non African American 06/22/2016 >60  >60 mL/min/1.73 m^2 Final    Lipase 06/22/2016 16  4 - 60 U/L Final    POC Preg Test, Ur 06/22/2016 Negative  Negative Final     Acceptable 06/22/2016 Yes   Final    Specimen UA 06/22/2016 Urine, Clean Catch   Final    Color, UA 06/22/2016 Yellow  Yellow, Straw, Radha Final    Appearance, UA 06/22/2016 Clear  Clear Final    pH, UA 06/22/2016 7.0  5.0 - 8.0 Final    Specific Gravity, UA 06/22/2016 1.015  1.005 - 1.030 Final    Protein, UA 06/22/2016 Negative  Negative Final    Glucose, UA 06/22/2016 Negative  Negative Final    Ketones, UA  06/22/2016 Negative  Negative Final    Bilirubin (UA) 06/22/2016 Negative  Negative Final    Occult Blood UA 06/22/2016 Negative  Negative Final    Nitrite, UA 06/22/2016 Negative  Negative Final    Urobilinogen, UA 06/22/2016 Negative  <2.0 EU/dL Final    Leukocytes, UA 06/22/2016 Negative  Negative Final    Amylase 06/22/2016 46  20 - 110 U/L Final   ]    Assessment:    1. Bipolar depression    2. Migraine without aura and without status migrainosus, not intractable    3. Insomnia due to psychological stress          Marly VALLES was seen today for follow-up.    Diagnoses and all orders for this visit:    Bipolar depression   -Improved. Patient reports only symptoms related to grief and no anxiety.  Exam improved compared to previous visit.   -Increase dose to 50 mg daily for 2 weeks.   -Return to clinic in 2 weeks for repeat evaluation and medication adjustment.   -Schedule consultation with Psychiatry and therapy.    Migraine without aura and without status migrainosus, not intractable  -     sumatriptan (IMITREX) 50 MG tablet; 1 tablet by mouth every 2 hours as needed for severe headache pain. Do not exceed 4 doses in a 24 hour period.  - Unchanged. Discussed external factors causing headaches. Continue stress reduction. Complete PA for Propranolol.  - Abortive medication prescribed.    Insomnia due to psychological stress  -     Discontinue: QUEtiapine (SEROQUEL) 50 MG tablet; Take 1 tablet (50 mg total) by mouth nightly as needed.  - Stable. Seroquel increased.    A total of 25 minutes was spent with the patient during this encounter. More than 50% of the encounter was spent counseling the patient regarding treatment options, expected outcomes, and coordination of care.      FOLLOW UP: Follow-up in about 2 weeks (around 11/9/2018) for Follow up.

## 2018-10-26 NOTE — TELEPHONE ENCOUNTER
Medication refilled. Patient should have enough to increase dose as prescribed. Will change prescription at 2 week follow up.

## 2018-10-29 DIAGNOSIS — F31.9 BIPOLAR DEPRESSION: ICD-10-CM

## 2018-10-29 RX ORDER — LAMOTRIGINE 25 MG/1
TABLET ORAL
Qty: 90 TABLET | Refills: 0 | Status: CANCELLED | OUTPATIENT
Start: 2018-10-29 | End: 2018-11-19

## 2018-10-29 NOTE — TELEPHONE ENCOUNTER
----- Message from Gladys Lopez sent at 10/29/2018  4:34 PM CDT -----  Contact: self [phone]  Pt is requesting a refill on lamoTRIgine (LAMICTAL) 25 MG tablet and [facility] needs authorization on medication  .  [facility] [address] [place] [address] ZACK BALTAZAR [address] [place] [address] [address]  Phone: [phone] Fax: [phone]

## 2018-10-30 ENCOUNTER — TELEPHONE (OUTPATIENT)
Dept: FAMILY MEDICINE | Facility: CLINIC | Age: 28
End: 2018-10-30

## 2018-10-30 NOTE — TELEPHONE ENCOUNTER
This medication was refilled on 10/26/2018 to have 90 tablets dispensed.  Patient does not need any further refills until next office visit.

## 2018-10-30 NOTE — TELEPHONE ENCOUNTER
----- Message from Kenn Canchola sent at 10/30/2018  9:40 AM CDT -----  Contact: Self/[phone]  Patient called to inform the staff that Raymond stated that she did not receive her prescription:  lamoTRIgine (LAMICTAL) 25 MG tablet. She would like to speak to the staff ASAP. Thank you.

## 2018-11-02 ENCOUNTER — PATIENT OUTREACH (OUTPATIENT)
Dept: ADMINISTRATIVE | Facility: HOSPITAL | Age: 28
End: 2018-11-02

## 2018-11-02 ENCOUNTER — HOSPITAL ENCOUNTER (EMERGENCY)
Facility: HOSPITAL | Age: 28
Discharge: HOME | End: 2018-11-02
Attending: EMERGENCY MEDICINE
Payer: COMMERCIAL

## 2018-11-02 VITALS
SYSTOLIC BLOOD PRESSURE: 137 MMHG | TEMPERATURE: 99 F | DIASTOLIC BLOOD PRESSURE: 76 MMHG | RESPIRATION RATE: 20 BRPM | BODY MASS INDEX: 31.76 KG/M2 | WEIGHT: 186 LBS | OXYGEN SATURATION: 99 % | HEART RATE: 66 BPM | HEIGHT: 64 IN

## 2018-11-02 DIAGNOSIS — N93.9 ABNORMAL VAGINAL BLEEDING: Primary | ICD-10-CM

## 2018-11-02 LAB
ALBUMIN SERPL BCP-MCNC: 4.6 G/DL
ALP SERPL-CCNC: 55 U/L
ALT SERPL W/O P-5'-P-CCNC: 14 U/L
ANION GAP SERPL CALC-SCNC: 11 MMOL/L
AST SERPL-CCNC: 16 U/L
B-HCG UR QL: NEGATIVE
BACTERIA #/AREA URNS HPF: ABNORMAL /HPF
BASOPHILS # BLD AUTO: 0.03 K/UL
BASOPHILS NFR BLD: 0.6 %
BILIRUB SERPL-MCNC: 0.6 MG/DL
BILIRUB UR QL STRIP: NEGATIVE
BUN SERPL-MCNC: 9 MG/DL
CALCIUM SERPL-MCNC: 9.5 MG/DL
CHLORIDE SERPL-SCNC: 108 MMOL/L
CLARITY UR: ABNORMAL
CO2 SERPL-SCNC: 21 MMOL/L
COLOR UR: YELLOW
CREAT SERPL-MCNC: 0.8 MG/DL
CTP QC/QA: YES
DIFFERENTIAL METHOD: ABNORMAL
EOSINOPHIL # BLD AUTO: 0.2 K/UL
EOSINOPHIL NFR BLD: 4.3 %
ERYTHROCYTE [DISTWIDTH] IN BLOOD BY AUTOMATED COUNT: 12.8 %
EST. GFR  (AFRICAN AMERICAN): >60 ML/MIN/1.73 M^2
EST. GFR  (NON AFRICAN AMERICAN): >60 ML/MIN/1.73 M^2
GLUCOSE SERPL-MCNC: 87 MG/DL
GLUCOSE UR QL STRIP: NEGATIVE
HCT VFR BLD AUTO: 38.7 %
HGB BLD-MCNC: 12.8 G/DL
HGB UR QL STRIP: ABNORMAL
KETONES UR QL STRIP: NEGATIVE
LEUKOCYTE ESTERASE UR QL STRIP: NEGATIVE
LYMPHOCYTES # BLD AUTO: 2 K/UL
LYMPHOCYTES NFR BLD: 41.9 %
MCH RBC QN AUTO: 31.1 PG
MCHC RBC AUTO-ENTMCNC: 33.1 G/DL
MCV RBC AUTO: 94 FL
MICROSCOPIC COMMENT: ABNORMAL
MONOCYTES # BLD AUTO: 0.3 K/UL
MONOCYTES NFR BLD: 6.8 %
NEUTROPHILS # BLD AUTO: 2.2 K/UL
NEUTROPHILS NFR BLD: 46.4 %
NITRITE UR QL STRIP: NEGATIVE
PH UR STRIP: 6 [PH] (ref 5–8)
PLATELET # BLD AUTO: 268 K/UL
PMV BLD AUTO: 10.8 FL
POTASSIUM SERPL-SCNC: 3.7 MMOL/L
PROT SERPL-MCNC: 7.3 G/DL
PROT UR QL STRIP: NEGATIVE
RBC # BLD AUTO: 4.12 M/UL
RBC #/AREA URNS HPF: >100 /HPF (ref 0–4)
SODIUM SERPL-SCNC: 140 MMOL/L
SP GR UR STRIP: 1.02 (ref 1–1.03)
SQUAMOUS #/AREA URNS HPF: 5 /HPF
URN SPEC COLLECT METH UR: ABNORMAL
UROBILINOGEN UR STRIP-ACNC: NEGATIVE EU/DL
WBC # BLD AUTO: 4.84 K/UL
WBC #/AREA URNS HPF: 0 /HPF (ref 0–5)

## 2018-11-02 PROCEDURE — 96374 THER/PROPH/DIAG INJ IV PUSH: CPT

## 2018-11-02 PROCEDURE — 80053 COMPREHEN METABOLIC PANEL: CPT

## 2018-11-02 PROCEDURE — 85025 COMPLETE CBC W/AUTO DIFF WBC: CPT

## 2018-11-02 PROCEDURE — 81000 URINALYSIS NONAUTO W/SCOPE: CPT

## 2018-11-02 PROCEDURE — 63600175 PHARM REV CODE 636 W HCPCS: Performed by: NURSE PRACTITIONER

## 2018-11-02 PROCEDURE — 96361 HYDRATE IV INFUSION ADD-ON: CPT

## 2018-11-02 PROCEDURE — 25000003 PHARM REV CODE 250: Performed by: PHYSICIAN ASSISTANT

## 2018-11-02 PROCEDURE — 81025 URINE PREGNANCY TEST: CPT | Performed by: PHYSICIAN ASSISTANT

## 2018-11-02 PROCEDURE — 99284 EMERGENCY DEPT VISIT MOD MDM: CPT | Mod: 25

## 2018-11-02 RX ORDER — KETOROLAC TROMETHAMINE 30 MG/ML
15 INJECTION, SOLUTION INTRAMUSCULAR; INTRAVENOUS
Status: COMPLETED | OUTPATIENT
Start: 2018-11-02 | End: 2018-11-02

## 2018-11-02 RX ADMIN — KETOROLAC TROMETHAMINE 15 MG: 30 INJECTION, SOLUTION INTRAMUSCULAR at 02:11

## 2018-11-02 RX ADMIN — SODIUM CHLORIDE 1000 ML: 0.9 INJECTION, SOLUTION INTRAVENOUS at 02:11

## 2018-11-02 NOTE — ED NOTES
"Pt. Reports she removed her IV. States " I went to school and I know what I am doing". Pt. Noted with coban to her arm.   "

## 2018-11-02 NOTE — ED TRIAGE NOTES
""I was standing up at work when I started gushing blood" (x20 mins). LMP 10/23/2018 but states this isn't her period because it already ended. Also c/o abdominal pain. Denies N/V, dysuria  "

## 2018-11-02 NOTE — DISCHARGE INSTRUCTIONS
Follow-up with your OB/GYN as soon as possible as discussed.    Return to the emergency department for any new or worsening symptoms or any additional concerns as needed.

## 2018-11-02 NOTE — ED PROVIDER NOTES
"Encounter Date: 11/2/2018    SORT: LMP last week. Began with copious vaginal bleeding, lower abdominal pain just pta. +lightheadedness.     SCRIBE #1 NOTE: I, Ashley Dorantes, am scribing for, and in the presence of,  Lul Howard NP. I have scribed the following portions of the note - Other sections scribed: ROS and HPI.       History     Chief Complaint   Patient presents with    Vaginal Bleeding     "I was at work, just standing and started gushimg lots of blood." C/o lower abdominal pain and weakness.     CC: Vaginal Bleeding    HPI: This 28 y.o. female with a past medical history of Bipolar disorder and Bronchitis, chronic, presents to the ED complaining of an acute onset of heavy vaginal bleeding just PTA. 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 She is currently on a depo shot, started 4 months ago, had a second dose this month. She was on yesy IUD which made her gain weight so she discontinued. She was then taking birth control pills until she started on a depo shot. She reports she never had similar episode of vaginal bleeding using birth controls and notes her periods are usually regular. Her LNMP was 2 weeks ago. She also reports lower abdominal cramps and dizziness with her vaginal bleeding today. She denies LOC. Denies any other GI/ sx. Denies any prior hx of abdominal surgeries.       The history is provided by the patient. No  was used.     Review of patient's allergies indicates:   Allergen Reactions    Sulfa (sulfonamide antibiotics) Anaphylaxis and Swelling     Past Medical History:   Diagnosis Date    Bipolar disorder     Bronchitis, chronic      Past Surgical History:   Procedure Laterality Date    rectocele       Family History   Problem Relation Age of Onset    Hypertension Mother     Liver disease Father     Breast cancer Neg Hx     Colon cancer Neg Hx     Ovarian cancer Neg Hx      Social History     Tobacco Use "    Smoking status: Former Smoker     Packs/day: 0.50     Years: 4.00     Pack years: 2.00     Types: Cigarettes   Substance Use Topics    Alcohol use: Yes     Alcohol/week: 0.5 oz     Types: 1 Standard drinks or equivalent per week     Comment: occasional    Drug use: No     Review of Systems   Constitutional: Negative for chills and fever.   Gastrointestinal: Positive for abdominal pain (cramps, lower). Negative for diarrhea, nausea and vomiting.   Genitourinary: Positive for vaginal bleeding. Negative for dysuria, frequency and vaginal discharge.   Musculoskeletal: Negative for back pain.   Neurological: Positive for dizziness. Negative for syncope.       Physical Exam     Initial Vitals [11/02/18 1334]   BP Pulse Resp Temp SpO2   (!) 172/87 81 16 98.3 °F (36.8 °C) 99 %      MAP       --         Physical Exam    Nursing note and vitals reviewed.  Constitutional: She appears well-developed and well-nourished. She is not diaphoretic. No distress.   HENT:   Head: Normocephalic and atraumatic.   Right Ear: External ear normal.   Left Ear: External ear normal.   Nose: Nose normal.   Eyes: Conjunctivae and EOM are normal. Pupils are equal, round, and reactive to light. Right eye exhibits no discharge. Left eye exhibits no discharge. No scleral icterus.   Neck: Normal range of motion. Neck supple. No thyromegaly present. No tracheal deviation present. No JVD present.   Cardiovascular: Normal rate, regular rhythm, normal heart sounds and intact distal pulses. Exam reveals no gallop and no friction rub.    No murmur heard.  Pulmonary/Chest: Breath sounds normal. No stridor. No respiratory distress. She has no wheezes. She has no rhonchi. She has no rales.   Abdominal: Soft. Bowel sounds are normal. She exhibits no distension and no mass. There is no tenderness. There is no rebound and no guarding.   Genitourinary: Uterus normal. Pelvic exam was performed with patient supine. Uterus is not enlarged and not tender. Cervix  exhibits no motion tenderness, no discharge and no friability. Right adnexum displays no mass, no tenderness and no fullness. Left adnexum displays no mass, no tenderness and no fullness. There is bleeding in the vagina. No erythema or tenderness in the vagina. No foreign body in the vagina. No signs of injury around the vagina. No vaginal discharge found.   Genitourinary Comments: No external genital abnormalities.  There is moderate blood in the vaginal vault.  No discharge or other abnormalities.  Cervical os is closed.  Cervix is not friable and there is no discharge. No CMT.  No adnexal or uterine tenderness or mass. Exam assisted by MARQUISE Chong.   Musculoskeletal: Normal range of motion. She exhibits no edema or tenderness.   Lymphadenopathy:     She has no cervical adenopathy.   Neurological: She is alert and oriented to person, place, and time. She has normal strength and normal reflexes. She displays normal reflexes. No cranial nerve deficit or sensory deficit.   Skin: Skin is warm and dry. No rash and no abscess noted. No erythema. No pallor.   Psychiatric: She has a normal mood and affect. Her behavior is normal. Judgment and thought content normal.         ED Course   Procedures  Labs Reviewed   URINALYSIS, REFLEX TO URINE CULTURE - Abnormal; Notable for the following components:       Result Value    Appearance, UA Hazy (*)     Occult Blood UA 3+ (*)     All other components within normal limits    Narrative:     Preferred Collection Type->Urine, Clean Catch   CBC W/ AUTO DIFFERENTIAL - Abnormal; Notable for the following components:    MCH 31.1 (*)     All other components within normal limits   COMPREHENSIVE METABOLIC PANEL - Abnormal; Notable for the following components:    CO2 21 (*)     All other components within normal limits   URINALYSIS MICROSCOPIC - Abnormal; Notable for the following components:    RBC, UA >100 (*)     All other components within normal limits    Narrative:     Preferred  Collection Type->Urine, Clean Catch   POCT URINE PREGNANCY          Imaging Results          US Pelvis Comp with Transvag NON-OB (xpd (Final result)  Result time 18 15:51:27    Final result by Angela Bass MD (18 15:51:27)                 Impression:      Fluid distends the endometrial canal with the suggestion of either a pedunculated submucosal fibroid or endometrial polyp.      Electronically signed by: Angela Bass MD  Date:    2018  Time:    15:51             Narrative:    EXAMINATION:  US PELVIS COMP WITH TRANSVAG NON-OB (XPD)    CLINICAL HISTORY:  vaginal bleeding and lower abdominal pain;    TECHNIQUE:  Transabdominal sonography of the pelvis was performed, followed by transvaginal sonography to better evaluate the uterus and ovaries.    COMPARISON:  None.    FINDINGS:  The uterus measures 7.2 x 4.3 by 6.4 cm.  There is fluid distending the endometrial canal.  The endometrial canal has an irregular contour.  There is a suggestion of a pedunculated region of soft tissue extending into the fluid within the endometrium and possible vascularity of its stalk.    The right ovary measures 2.2 x 1.8 x 2.2 cm in the left ovary measures 2.2 x 2.1 x 1.9 cm.                                 Medical Decision Making:   History:   Old Medical Records: I decided to obtain old medical records.  Differential Diagnosis:   Spontaneous , ruptured ectopic pregnancy, hormonal imbalance secondary to contraceptive use, uterine leiomyoma, symptomatic anemia, others  Clinical Tests:   Lab Tests: Ordered and Reviewed  Radiological Study: Ordered and Reviewed  ED Management:  28-year-old female presenting for evaluation of reported heavy vaginal bleeding with associated lower abdominal suprapubic cramping.  Began Depo-Provera injections for contraception 4 months ago.  LMP was 2 weeks ago and was reportedly normal. She reports associated lightheadedness but denies syncope.  Afebrile, well appearing, in  no distress.  Vitals are within normal limits and patient is hemodynamically stable. There is moderate blood in the vaginal vault without appreciable discharge, laceration, foreign object, or other abnormality. Cervical os is closed.  Cervix is not friable and there is no discharge. No adnexal or uterine tenderness or mass. Abdomen is soft and nontender without rigidity or guarding. No peritoneal signs. Labs are reassuring.  Patient is not anemic.  Ultrasound shows evidence of submucosal fibroid or endometrial polyp.  Likely cause of the patient's bleeding.  Given hemodynamic stability I believe patient is safe for discharge and outpatient follow-up.  Advised patient to follow up with her Ob/Gyn for re-evaluation and further management.  ED return precautions given. All questions regarding diagnosis and plan were answered to the patient's fullest possible satisfaction. Patient expressed understanding of diagnosis, discharge instructions, and return precautions.  Other:   I have discussed this case with another health care provider.       <> Summary of the Discussion: Case discussed with my attending physician who agreed with the assessment and plan.            Scribe Attestation:   Scribe #1: I performed the above scribed service and the documentation accurately describes the services I performed. I attest to the accuracy of the note.    Attending Attestation:     Physician Attestation Statement for NP/PA:   I discussed this assessment and plan of this patient with the NP/PA, but I did not personally examine the patient. The face to face encounter was performed by the NP/PA.        Physician Attestation for Scribe:  Physician Attestation Statement for Scribe #1: I, Lul Howard NP, reviewed documentation, as scribed by Ashley Dorantes  in my presence, and it is both accurate and complete.                    Clinical Impression:   The encounter diagnosis was Abnormal vaginal bleeding.       Disposition:   Disposition:  Discharged  Condition: Stable                        Lul Howard NP  11/07/18 1305       Lul Skelton III, MD  11/10/18 [id number]

## 2018-11-08 ENCOUNTER — TELEPHONE (OUTPATIENT)
Dept: FAMILY MEDICINE | Facility: CLINIC | Age: 28
End: 2018-11-08

## 2018-11-08 NOTE — TELEPHONE ENCOUNTER
Talked to pt. Regarding her OBGYN appointment for pap, pt. Stated that she has an OBGYN at [facility] and that she would call and have them send over the medical records.

## 2018-11-13 ENCOUNTER — OFFICE VISIT (OUTPATIENT)
Dept: FAMILY MEDICINE | Facility: CLINIC | Age: 28
End: 2018-11-13
Payer: COMMERCIAL

## 2018-11-13 VITALS
DIASTOLIC BLOOD PRESSURE: 72 MMHG | WEIGHT: 185.88 LBS | HEIGHT: 64 IN | RESPIRATION RATE: 16 BRPM | OXYGEN SATURATION: 98 % | HEART RATE: 88 BPM | TEMPERATURE: 97 F | SYSTOLIC BLOOD PRESSURE: 110 MMHG | BODY MASS INDEX: 31.73 KG/M2

## 2018-11-13 DIAGNOSIS — F51.02 INSOMNIA DUE TO PSYCHOLOGICAL STRESS: ICD-10-CM

## 2018-11-13 DIAGNOSIS — N94.89 ENDOMETRIAL MASS: ICD-10-CM

## 2018-11-13 DIAGNOSIS — F31.9 BIPOLAR DEPRESSION: Primary | ICD-10-CM

## 2018-11-13 DIAGNOSIS — L70.8 ADULT PREMENSTRUAL ACNE: ICD-10-CM

## 2018-11-13 PROCEDURE — 3008F BODY MASS INDEX DOCD: CPT | Mod: CPTII,S$GLB,, | Performed by: FAMILY MEDICINE

## 2018-11-13 PROCEDURE — 99214 OFFICE O/P EST MOD 30 MIN: CPT | Mod: S$GLB,,, | Performed by: FAMILY MEDICINE

## 2018-11-13 PROCEDURE — 99999 PR PBB SHADOW E&M-EST. PATIENT-LVL IV: CPT | Mod: PBBFAC,,, | Performed by: FAMILY MEDICINE

## 2018-11-13 RX ORDER — LAMOTRIGINE 100 MG/1
100 TABLET ORAL DAILY
Qty: 90 TABLET | Refills: 1 | Status: SHIPPED | OUTPATIENT
Start: 2018-11-13 | End: 2018-12-13 | Stop reason: ALTCHOICE

## 2018-11-13 NOTE — PATIENT INSTRUCTIONS
"Regimen #1    Benzoyl peroxide wash AM  Sensitive skin moisturizer AM    Benzoyl peroxide wash PM  Differin gel (over the counter) PM  Sensitive skin moisturizer PM      Regimen #2    Benzoyl peroxide wash AM  Sensitive skin moisturizer AM    Benzoyl peroxide wash PM  "Green Cream" (over the counter at [employer])  Sensitive skin moisturizer PM      "

## 2018-11-13 NOTE — PROGRESS NOTES
Chief Complaint   Patient presents with    Follow-up       HPI    Marly Mayfield is 28 y.o. female. The primary encounter diagnosis was Bipolar depression. Diagnoses of Insomnia due to psychological stress, Adult premenstrual acne, and Endometrial mass were also pertinent to this visit.    28 year old female with Bipolar depression returns to clinic for follow up.  Patient reports since last visit she has increased to 100 mg daily.  She reports continued crying episodes but attributes this to grief only.  She expresses concern regarding anger.  She also admits to some restlessness and increased energy.  She reports sleep has improved.    Patient also reports new issue of acne that has worsened over the last month.  She also reports an episode of sudden severe vaginal bleeding.  She reports evaluation by ED and her GYN, Labadie at [facility].  She reports being informed of a mass in the uterus.  She has consented to surgical intervention.     Review of Systems   Constitutional: Negative for activity change and unexpected weight change.   HENT: Negative for hearing loss, rhinorrhea and trouble swallowing.    Eyes: Negative for discharge and visual disturbance.   Respiratory: Negative for chest tightness and wheezing.    Cardiovascular: Negative for chest pain and palpitations.   Gastrointestinal: Negative for blood in stool, constipation, diarrhea and vomiting.   Endocrine: Negative for polydipsia and polyuria.   Genitourinary: Negative for difficulty urinating, dysuria, hematuria and menstrual problem.   Musculoskeletal: Negative for arthralgias, joint swelling and neck pain.   Neurological: Positive for headaches. Negative for weakness.   Psychiatric/Behavioral: Positive for dysphoric mood. Negative for confusion.           Current Outpatient Medications:     lamoTRIgine (LAMICTAL) 100 MG tablet, Take 1 tablet (100 mg total) by mouth once daily., Disp: 90 tablet, Rfl: 1    medroxyprogesterone acetate (DEPO-PROVERA  "IM), Inject into the muscle., Disp: , Rfl:     propranolol (INNOPRAN XL) 80 mg 24 hr capsule, Take 1 capsule (80 mg total) by mouth nightly., Disp: 90 capsule, Rfl: 1    QUEtiapine (SEROQUEL) 50 MG tablet, Take 1 tablet (50 mg total) by mouth nightly as needed., Disp: 90 tablet, Rfl: 1      Blood pressure 110/72, pulse 88, temperature 97.4 °F (36.3 °C), resp. rate 16, height 5' 4" (1.626 m), weight 84.3 kg (185 lb 13.6 oz), last menstrual period 10/23/2018, SpO2 98 %.    Physical Exam   Constitutional: Vital signs are normal. She appears well-developed and well-nourished. She does not appear ill. No distress.       Admission on 11/02/2018, Discharged on 11/02/2018   Component Date Value Ref Range Status    Specimen UA 11/02/2018 Urine, Clean Catch   Final    Color, UA 11/02/2018 Yellow  Yellow, Straw, Radha Final    Appearance, UA 11/02/2018 Hazy* Clear Final    pH, UA 11/02/2018 6.0  5.0 - 8.0 Final    Specific Gravity, UA 11/02/2018 1.020  1.005 - 1.030 Final    Protein, UA 11/02/2018 Negative  Negative Final    Glucose, UA 11/02/2018 Negative  Negative Final    Ketones, UA 11/02/2018 Negative  Negative Final    Bilirubin (UA) 11/02/2018 Negative  Negative Final    Occult Blood UA 11/02/2018 3+* Negative Final    Nitrite, UA 11/02/2018 Negative  Negative Final    Urobilinogen, UA 11/02/2018 Negative  <2.0 EU/dL Final    Leukocytes, UA 11/02/2018 Negative  Negative Final    POC Preg Test, Ur 11/02/2018 Negative  Negative Final     Acceptable 11/02/2018 Yes   Final    WBC 11/02/2018 4.84  3.90 - 12.70 K/uL Final    RBC 11/02/2018 4.12  4.00 - 5.40 M/uL Final    Hemoglobin 11/02/2018 12.8  12.0 - 16.0 g/dL Final    Hematocrit 11/02/2018 38.7  37.0 - 48.5 % Final    MCV 11/02/2018 94  82 - 98 fL Final    MCH 11/02/2018 31.1* 27.0 - 31.0 pg Final    MCHC 11/02/2018 33.1  32.0 - 36.0 g/dL Final    RDW 11/02/2018 12.8  11.5 - 14.5 % Final    Platelets 11/02/2018 268  150 - 350 " K/uL Final    MPV 11/02/2018 10.8  9.2 - 12.9 fL Final    Gran # (ANC) 11/02/2018 2.2  1.8 - 7.7 K/uL Final    Lymph # 11/02/2018 2.0  1.0 - 4.8 K/uL Final    Mono # 11/02/2018 0.3  0.3 - 1.0 K/uL Final    Eos # 11/02/2018 0.2  0.0 - 0.5 K/uL Final    Baso # 11/02/2018 0.03  0.00 - 0.20 K/uL Final    Gran% 11/02/2018 46.4  38.0 - 73.0 % Final    Lymph% 11/02/2018 41.9  18.0 - 48.0 % Final    Mono% 11/02/2018 6.8  4.0 - 15.0 % Final    Eosinophil% 11/02/2018 4.3  0.0 - 8.0 % Final    Basophil% 11/02/2018 0.6  0.0 - 1.9 % Final    Differential Method 11/02/2018 Automated   Final    Sodium 11/02/2018 140  136 - 145 mmol/L Final    Potassium 11/02/2018 3.7  3.5 - 5.1 mmol/L Final    Chloride 11/02/2018 108  95 - 110 mmol/L Final    CO2 11/02/2018 21* 23 - 29 mmol/L Final    Glucose 11/02/2018 87  70 - 110 mg/dL Final    BUN, Bld 11/02/2018 9  6 - 20 mg/dL Final    Creatinine 11/02/2018 0.8  0.5 - 1.4 mg/dL Final    Calcium 11/02/2018 9.5  8.7 - 10.5 mg/dL Final    Total Protein 11/02/2018 7.3  6.0 - 8.4 g/dL Final    Albumin 11/02/2018 4.6  3.5 - 5.2 g/dL Final    Total Bilirubin 11/02/2018 0.6  0.1 - 1.0 mg/dL Final    Alkaline Phosphatase 11/02/2018 55  55 - 135 U/L Final    AST 11/02/2018 16  10 - 40 U/L Final    ALT 11/02/2018 14  10 - 44 U/L Final    Anion Gap 11/02/2018 11  8 - 16 mmol/L Final    eGFR if African American 11/02/2018 >60  >60 mL/min/1.73 m^2 Final    eGFR if non African American 11/02/2018 >60  >60 mL/min/1.73 m^2 Final    RBC, UA 11/02/2018 >100* 0 - 4 /hpf Final    WBC, UA 11/02/2018 0  0 - 5 /hpf Final    Bacteria, UA 11/02/2018 None  None-Occ /hpf Final    Squam Epithel, UA 11/02/2018 5  /hpf Final    Microscopic Comment 11/02/2018 SEE COMMENT   Final   ]    Assessment:    1. Bipolar depression    2. Insomnia due to psychological stress    3. Adult premenstrual acne    4. Endometrial mass          Marly R was seen today for follow-up.    Diagnoses and all orders  for this visit:    Bipolar depression  -     lamoTRIgine (LAMICTAL) 100 MG tablet; Take 1 tablet (100 mg total) by mouth once daily.  - Improved. Lamictal dosage and administration changed to improve compliance. No further increase at this time.  - Patient urged to schedule consultation with Psychiatry due to early mild symptoms of hypomania.  - Patient informed that should symptoms persist beyond week 2 of increase to return to clinic. Consider increase of Seroquel to 2 times daily.    Insomnia due to psychological stress   -Improved. Continue to monitor. Continue Seroquel at bedtime.    Adult premenstrual acne   -New problem. OTC regimen recommended. See AVS.   -Benzoyl peroxide wash with retin A or Adapalene nightly with sensitive skin moisturizer 2 times daily.    Endometrial mass   -New problem. Imaging reviewed and mass confirmed. Patient to follow up with GYN for surgical intervention.   -Operative report and pathology requested after procedure.    A total of 35 minutes was spent with the patient during this encounter. More than 50% of the encounter was spent counseling the patient regarding treatment options, expected outcomes, and coordination of care.          FOLLOW UP: Follow-up in about 4 weeks (around 12/11/2018) for Depression follow up.

## 2018-12-12 NOTE — PROGRESS NOTES
"Outpatient Psychiatry Initial Visit (MD/NP)    12/13/2018    Marly Mayfield, a 28 y.o. female, presenting for initial evaluation visit. Met with patient.    Reason for Encounter: Referral from Dr. Gupta. Patient complains of   Chief Complaint   Patient presents with    New Patient     mood, depression   .    History of Present Illness: Marly Mayfield states that she was diagnosed with bipolar disorder when she was 15 years old. She was prescribed medication at that time but did not take it. She was put on Lexpro (at a dose that she does not remember) by a PCP at age 18 and that did not help. Over a year ago Dr. Sharpe put her on Zoloft which also did not help. She started zoloft at 25 mgs and it was raised to 50 mgs. She continued the zoloft until she saw Dr. Gupta in October of this year and it was stopped and she was put on Lamictal. She started Lamictal at 25 mgs for 7 days, then 50 mgs for 7 days, and then it was raised to 100 mgs. She feels that the Lamictal has helped her mood a little but her anger has not decreased and she is highly irritable. She states that her mother passed away 3 months ago and she went to see Dr. Sharpe after this and she was put on Valium 5 mg which did help to calm her. She states she still has some tablets left but does not want more valium. She would like something to help with her anger.    She is presently taking Lamictal 100 mg po qd for her mood and seroquel 50 mg po qhs prn sleep. Seroquel works for sleep at times, but if she takes it too late it makes her too sleepy the next day so she does not take it nightly. She does have 25 mg tablets and 50 mg tablets. She also has some 25 mg tabs of Lamictal left.     Her overall symptom complex includes: anger, feels anxious, easily agitated, irritable, feels sad "often", cries easily, history of cutting, history of suicidal ideation, history of suicide attempt with bottle of Tylenol PM, paranoia: feels like she is being followed and " "has run out of the store with 70 pound child in arms for fear of being followed by a man, auditory hallucinations: hears noises and nothing is in the room/moving, poor sleep even with Seroquel 50 mgs, history of energy without sleep for 2 or 3 days at at time, history of impulsive shoplifting and excessive shopping.      Review Of Systems:     GENERAL:  Obese female  SKIN:  No rashes or lacerations  HEAD:  Daily headaches (has migraines and is seen by PCP for these).  EYES:  No exophthalmos, jaundice or blindness  EARS:  No dizziness, tinnitus or hearing loss  NOSE:  No changes in smell  MOUTH & THROAT:  No dyskinetic movements or obvious goiter  CHEST:  No shortness of breath, hyperventilation or cough  CARDIOVASCULAR:  No tachycardia or chest pain  ABDOMEN:  No nausea, vomiting, pain, constipation or diarrhea  URINARY:  No frequency, or dysuria; low libido  MUSCULOSKELETAL:  No pain or stiffness of the joints  NEUROLOGIC:  No abnormal movements    Current Evaluation:     Nutritional Screening: Considering the patient's height and weight, medications, medical history and preferences, should a referral be made to the dietitian? no    Constitutional  Vitals:  Most recent vital signs, dated less than 90 days prior to this appointment, were reviewed.    Vitals:    12/13/18 0923   BP: 138/80   Pulse: 76   Weight: 86.3 kg (190 lb 4.1 oz)   Height: 5' 4" (1.626 m)        General:  unremarkable, age appropriate     Musculoskeletal  Muscle Strength/Tone:  no tremor, no tic   Gait & Station:  non-ataxic     Psychiatric  Speech:  no latency; no press   Mood & Affect:  "numb"  labile   Thought Process:  normal and logical; appropriately abstract   Associations:  intact   Thought Content:  normal, no suicidality, no homicidality, delusions, or paranoia   Insight:  has awareness of illness   Judgement: behavior is adequate to circumstances   Orientation:  grossly intact   Memory: intact for content of interview; immediate memory " is 3/3 objects, after 5 minutes is 3/3 objects   Language: grossly intact   Attention Span & Concentration:  able to focus; able to spell WORLD forward and backward   Fund of Knowledge:  intact and appropriate to age and level of education; adequate (President, Obm[employer], Karthikeyan Gleason, current event: kids on TV tonight with the school and they have a field trip next Tuesday)       Relevant Elements of Neurological Exam: normal gait    Functioning in Relationships:  Spouse/partner: Good  Peers: Good  Employers: Good    Laboratory Data  No visits with results within 1 Month(s) from this visit.   Latest known visit with results is:   Admission on 11/02/2018, Discharged on 11/02/2018   Component Date Value Ref Range Status    Specimen UA 11/02/2018 Urine, Clean Catch   Final    Color, UA 11/02/2018 Yellow  Yellow, Straw, Radha Final    Appearance, UA 11/02/2018 Hazy* Clear Final    pH, UA 11/02/2018 6.0  5.0 - 8.0 Final    Specific Gravity, UA 11/02/2018 1.020  1.005 - 1.030 Final    Protein, UA 11/02/2018 Negative  Negative Final    Glucose, UA 11/02/2018 Negative  Negative Final    Ketones, UA 11/02/2018 Negative  Negative Final    Bilirubin (UA) 11/02/2018 Negative  Negative Final    Occult Blood UA 11/02/2018 3+* Negative Final    Nitrite, UA 11/02/2018 Negative  Negative Final    Urobilinogen, UA 11/02/2018 Negative  <2.0 EU/dL Final    Leukocytes, UA 11/02/2018 Negative  Negative Final    POC Preg Test, Ur 11/02/2018 Negative  Negative Final     Acceptable 11/02/2018 Yes   Final    WBC 11/02/2018 4.84  3.90 - 12.70 K/uL Final    RBC 11/02/2018 4.12  4.00 - 5.40 M/uL Final    Hemoglobin 11/02/2018 12.8  12.0 - 16.0 g/dL Final    Hematocrit 11/02/2018 38.7  37.0 - 48.5 % Final    MCV 11/02/2018 94  82 - 98 fL Final    MCH 11/02/2018 31.1* 27.0 - 31.0 pg Final    MCHC 11/02/2018 33.1  32.0 - 36.0 g/dL Final    RDW 11/02/2018 12.8  11.5 - 14.5 % Final    Platelets 11/02/2018 268  691  - 350 K/uL Final    MPV 11/02/2018 10.8  9.2 - 12.9 fL Final    Gran # (ANC) 11/02/2018 2.2  1.8 - 7.7 K/uL Final    Lymph # 11/02/2018 2.0  1.0 - 4.8 K/uL Final    Mono # 11/02/2018 0.3  0.3 - 1.0 K/uL Final    Eos # 11/02/2018 0.2  0.0 - 0.5 K/uL Final    Baso # 11/02/2018 0.03  0.00 - 0.20 K/uL Final    Gran% 11/02/2018 46.4  38.0 - 73.0 % Final    Lymph% 11/02/2018 41.9  18.0 - 48.0 % Final    Mono% 11/02/2018 6.8  4.0 - 15.0 % Final    Eosinophil% 11/02/2018 4.3  0.0 - 8.0 % Final    Basophil% 11/02/2018 0.6  0.0 - 1.9 % Final    Differential Method 11/02/2018 Automated   Final    Sodium 11/02/2018 140  136 - 145 mmol/L Final    Potassium 11/02/2018 3.7  3.5 - 5.1 mmol/L Final    Chloride 11/02/2018 108  95 - 110 mmol/L Final    CO2 11/02/2018 21* 23 - 29 mmol/L Final    Glucose 11/02/2018 87  70 - 110 mg/dL Final    BUN, Bld 11/02/2018 9  6 - 20 mg/dL Final    Creatinine 11/02/2018 0.8  0.5 - 1.4 mg/dL Final    Calcium 11/02/2018 9.5  8.7 - 10.5 mg/dL Final    Total Protein 11/02/2018 7.3  6.0 - 8.4 g/dL Final    Albumin 11/02/2018 4.6  3.5 - 5.2 g/dL Final    Total Bilirubin 11/02/2018 0.6  0.1 - 1.0 mg/dL Final    Alkaline Phosphatase 11/02/2018 55  55 - 135 U/L Final    AST 11/02/2018 16  10 - 40 U/L Final    ALT 11/02/2018 14  10 - 44 U/L Final    Anion Gap 11/02/2018 11  8 - 16 mmol/L Final    eGFR if African American 11/02/2018 >60  >60 mL/min/1.73 m^2 Final    eGFR if non African American 11/02/2018 >60  >60 mL/min/1.73 m^2 Final    RBC, UA 11/02/2018 >100* 0 - 4 /hpf Final    WBC, UA 11/02/2018 0  0 - 5 /hpf Final    Bacteria, UA 11/02/2018 None  None-Occ /hpf Final    Squam Epithel, UA 11/02/2018 5  /hpf Final    Microscopic Comment 11/02/2018 SEE COMMENT   Final         Medications  Outpatient Encounter Medications as of 12/13/2018   Medication Sig Dispense Refill    lamoTRIgine (LAMICTAL) 100 MG tablet Take 1 tablet (100 mg total) by mouth once daily. 90 tablet 1     medroxyprogesterone acetate (DEPO-PROVERA IM) Inject into the muscle.      propranolol (INNOPRAN XL) 80 mg 24 hr capsule Take 1 capsule (80 mg total) by mouth nightly. 90 capsule 1    QUEtiapine (SEROQUEL) 50 MG tablet Take 1 tablet (50 mg total) by mouth nightly as needed. 90 tablet 1     No facility-administered encounter medications on file as of 12/13/2018.            Assessment - Diagnosis - Goals:     Impression: Bipolar disorder, depressed with psychotic features with anxious distress, and mood insomnia      ICD-10-CM ICD-9-CM   1. Bipolar I disorder, severe, current or most recent episode depressed, with psychotic features, with anxious distress F31.5 296.54   2. Mood insomnia F51.05 [id number]    F39    3. Drug therapy Z79.899 V58.69       Strengths and Liabilities: Strength: Patient accepts guidance/feedback, Strength: Patient is expressive/articulate., Strength: Patient is motivated for change., Liability: Patient is still grieving the loss of her mother    Treatment Goals:  Specify outcomes written in observable, behavioral terms:   Safety: Will call 911 or Crisis Line or go to ER for suicidal ideation, adverse effects of medication or any other emergency  Mood: Will no longer be inappropriately depressed; Will no longer have inappropriate levels/feelings of anger and irritability  Anxiety: Will no longer feel inappropriately anger without any catalyst.   Insomnia: Will get a minimum of 7 restful hours of sleep.   Psychotic features: Will no longer have auditory hallucinations and will no longer feel paranoid.    Treatment Plan/Recommendations:   · Medication Management: The risks and benefits of medication were discussed with the patient.  · The treatment plan and follow up plan were reviewed with the patient.   1. Safety: Call 911 or Crisis Line or go to ER for suicidal ideation, adverse effects of medication or any other emergency  2. Labs: Lipid profile, TSH, Free T3, Free T4, Urine Drug  Screening.  3. Taper off Lamictal: Take 1/2 tab po qd x 3 days, then take 25 mg x 3 days, then stop.   4. Start Olanzapine 5 mg po qhs for mood, anxiety, sleep. C  5. Seroquel 25 mgs to 50 mgs po qhs prn sleep.  6. Return to clinic in one week or sooner prn.     Patient agrees with POC.    INSTRUCTIONS  Instructed to call 911 or Crisis Line or go to ER for suicidal ideation, adverse effects of medication or any other emergency. Verbalizes understanding and plan to comply.    Instructed on uses, effects, side effects, adverse reactions and benefits vs risks of Zyprexa with emphasis on risks for NMS, movement problems (EPS), increase in appetite, and risk for metabolic syndrome and instructed on when to seek 911/ER. Verbalizes understanding and plans to comply      Return to Clinic: 1 week, as needed    Counseling time: 34 minutes  Total time:62 minutes  Consulting clinician was informed of the encounter and consult note.

## 2018-12-13 ENCOUNTER — LAB VISIT (OUTPATIENT)
Dept: LAB | Facility: HOSPITAL | Age: 28
End: 2018-12-13
Attending: NURSE PRACTITIONER
Payer: COMMERCIAL

## 2018-12-13 ENCOUNTER — OFFICE VISIT (OUTPATIENT)
Dept: PSYCHIATRY | Facility: CLINIC | Age: 28
End: 2018-12-13
Payer: COMMERCIAL

## 2018-12-13 VITALS
BODY MASS INDEX: 32.48 KG/M2 | SYSTOLIC BLOOD PRESSURE: 138 MMHG | HEART RATE: 76 BPM | DIASTOLIC BLOOD PRESSURE: 80 MMHG | HEIGHT: 64 IN | WEIGHT: 190.25 LBS

## 2018-12-13 DIAGNOSIS — F31.5 BIPOLAR I DISORDER, SEVERE, CURRENT OR MOST RECENT EPISODE DEPRESSED, WITH PSYCHOTIC FEATURES, WITH ANXIOUS DISTRESS: Primary | ICD-10-CM

## 2018-12-13 DIAGNOSIS — Z79.899 DRUG THERAPY: ICD-10-CM

## 2018-12-13 DIAGNOSIS — F51.05 MOOD INSOMNIA: ICD-10-CM

## 2018-12-13 DIAGNOSIS — F39 MOOD INSOMNIA: ICD-10-CM

## 2018-12-13 DIAGNOSIS — F31.5 BIPOLAR I DISORDER, SEVERE, CURRENT OR MOST RECENT EPISODE DEPRESSED, WITH PSYCHOTIC FEATURES, WITH ANXIOUS DISTRESS: ICD-10-CM

## 2018-12-13 LAB
AMPHET+METHAMPHET UR QL: NEGATIVE
BARBITURATES UR QL SCN>200 NG/ML: NEGATIVE
BENZODIAZ UR QL SCN>200 NG/ML: NEGATIVE
BZE UR QL SCN: NEGATIVE
CANNABINOIDS UR QL SCN: NEGATIVE
CHOLEST SERPL-MCNC: 175 MG/DL
CHOLEST/HDLC SERPL: 3.2 {RATIO}
CREAT UR-MCNC: 78 MG/DL
ESTIMATED AVG GLUCOSE: 97 MG/DL
ETHANOL UR-MCNC: <10 MG/DL
HBA1C MFR BLD HPLC: 5 %
HDLC SERPL-MCNC: 55 MG/DL
HDLC SERPL: 31.4 %
LDLC SERPL CALC-MCNC: 105.2 MG/DL
METHADONE UR QL SCN>300 NG/ML: NEGATIVE
NONHDLC SERPL-MCNC: 120 MG/DL
OPIATES UR QL SCN: NORMAL
PCP UR QL SCN>25 NG/ML: NEGATIVE
T3FREE SERPL-MCNC: 2.8 PG/ML
T4 FREE SERPL-MCNC: 0.92 NG/DL
TOXICOLOGY INFORMATION: NORMAL
TRIGL SERPL-MCNC: 74 MG/DL
TSH SERPL DL<=0.005 MIU/L-ACNC: 0.94 UIU/ML

## 2018-12-13 PROCEDURE — 84481 FREE ASSAY (FT-3): CPT

## 2018-12-13 PROCEDURE — 3008F BODY MASS INDEX DOCD: CPT | Mod: CPTII,S$GLB,, | Performed by: NURSE PRACTITIONER

## 2018-12-13 PROCEDURE — 36415 COLL VENOUS BLD VENIPUNCTURE: CPT

## 2018-12-13 PROCEDURE — 83036 HEMOGLOBIN GLYCOSYLATED A1C: CPT

## 2018-12-13 PROCEDURE — 80307 DRUG TEST PRSMV CHEM ANLYZR: CPT

## 2018-12-13 PROCEDURE — 84439 ASSAY OF FREE THYROXINE: CPT

## 2018-12-13 PROCEDURE — 84443 ASSAY THYROID STIM HORMONE: CPT

## 2018-12-13 PROCEDURE — 99204 OFFICE O/P NEW MOD 45 MIN: CPT | Mod: S$GLB,,, | Performed by: NURSE PRACTITIONER

## 2018-12-13 PROCEDURE — 80061 LIPID PANEL: CPT

## 2018-12-13 PROCEDURE — 99999 PR PBB SHADOW E&M-EST. PATIENT-LVL III: CPT | Mod: PBBFAC,,, | Performed by: NURSE PRACTITIONER

## 2018-12-13 RX ORDER — OLANZAPINE 5 MG/1
5 TABLET ORAL NIGHTLY
Qty: 30 TABLET | Refills: 0 | Status: SHIPPED | OUTPATIENT
Start: 2018-12-13 | End: 2018-12-21 | Stop reason: SDUPTHER

## 2018-12-13 NOTE — PATIENT INSTRUCTIONS
[facility] - DEPARTMENT OF PSYCHIATRY   NEW PATIENT ORIENTATION INFORMATION  OUTPATIENT SERVICES COUNSELING CONTRACT    We appreciate the opportunity to participate in your medical care and hope the following protocols will make it easier for you to receive quality treatment in our department.    1. PUNCTUALITY: Your appointment is scheduled for a fixed amount of time reserved especially for you.  To get the benefit of your appointment, please arrive early enough to allow time for parking and registration.  If you are late for your appointment, your clinician is not able to offer additional time.  Please make every effort to be on time.      2. PAYMENT FOR SERVICES:   Payments are expected at the time of service.  Please contact [phone] if you need to resolve issues involving your account at [facility] or to set up a payment plan.    3. CANCELLATION / MISSED APPOINTMENTS:   In order to receive quality care, all appointments must be kept.  Appointment may be cancelled, ONLY by talking with an  at phone number [phone], between 8:00 a.m. and 5:00 p.m., Monday through Friday, at least 24 hours before your appointment time.  Your clinician reserves this time specifically for you, and if you will be unable to use it, it is necessary that you cancel in a timely manner.  If you do not give at least 24-hour notice of cancellation a full fee will be assessed.  Please note that insurance does not cover no-show charges, so you will be billed directly.  If you are consistently late, cancel, or do not show for your appointments, our department reserves the right to terminate treatment    4. CALLING THE DEPARTMENT:   MESSAGES, SCHEDULE OR CANCEL APPOINTMENTS- In general you can reach the department by calling [phone], between 8:00 a.m. and 5:00 p.m., Monday through Friday, to schedule or cancel appointments or leave a message for your clinician.  It is advisable to schedule your visits  far in advance to obtain the most convenient times for your appointments.   AFTER HOURS, WEEKEND OR HOLIDAYS- For urgent questions after hours, weekends and holidays, calling the department number [phone] will connect you to the nursing staff on the Psychiatry Inpatient Unit.  The nursing staff will speak with you and contact the On Call psychiatrist if necessary.   EMERGENCY-  In case of a crisis when there is a concern of harm to self or others, call 911 or the office [phone] between 8:00 a.m. and 5:00 p.m., Monday through Friday.  After hours, weekends or holidays, please call 911 or go to the Emergency Department where you can be thoroughly evaluated by the On Call psychiatrist.  If possible, contact the psychiatrist on call at [phone] prior to leaving for the [facility] Emergency Department.    5. TEAM APPROACH:  Most patients receive therapy through our team system.  In the team system, your primary therapist will be a , psychologist, psychiatry resident or psychiatrist.  If your therapist is a , psychologist or psychiatry resident, please contact your primary therapist first in matters other than medications or acute medical problems.    6. PRESCRIPTION REFILLS:  Prescription refills must be done at your physician office visit.  You will be given a sufficient number of refills to last one extra month beyond your next appointment.  No additional refills will be approved beyond the original treatment plan.  After hours, sufficient medication may be approved to last until the next scheduled appointment. After hours requests for refills on controlled substances may be declined by the psychiatrist on call, as he or she may not be familiar with your case  Please work closely with your doctor so that you have sufficient medication until your next appointment.  Again, please note that no additional prescriptions will be approved per patient request over the phone.   No  "additional refills will be approved beyond the original treatment plan.   In certain exceptional situations, a phone consult appointment may be arranged, with appropriate charge, for the review and approval of prescription refills to last until the next scheduled appointment.    7. FOLLOW UP APPOINTMENTS:  Follow-up appointments can be made in person at the Psychiatry Appointment Desk, [place] [address], or by calling [phone], from 8am to 5pm, between Monday and Friday.  It is advisable to schedule your office visits far enough in advance to obtain the most convenient times for your appointments.    Revised Feb. 23, 2010 - F/OA1/Newpatient    You have been provided with a certain amount of medication with a specified number of refills.  Please follow up within an adequate time before you run out of medications.    REFILLS FOR CONTROLLED SUBSTANCES WILL NOT BE GIVEN WITHOUT AN APPOINTMENT.  I will not honor or fill automated refill requests from pharmacies.  You must come in for an appointment to get refills.    Please book your next appointment for myself or therapist by phone by calling our office at [phone].      PLEASE BE AT LEAST 15 MINUTES EARLY FOR YOUR NEXT APPOINTMENT.  PLEASE, DO NOT BE LATE OR YOU WILL BE TURNED AWAY AND ASKED TO RESCHEDULE.  YOU MUST COME EARLY TO ALLOW TIME FOR CHECK-IN AS WELL AS GET YOUR VITAL SIGNS AND GO OVER YOUR MEDICATIONS.  Tardiness is not fair to the patients who present after you and are on time for their appointments.  It causes a delay in the appointments for patients and staff.  IF YOU ARE LATE, THERE IS A POSSIBILITY THAT YOU WILL BE CHARGED FOR THE APPOINTMENT TIME PERSONALLY AND IT WILL NOT GO TO YOUR INSURANCE.  YOU MAY ALSO BE DISCHARGED FROM CLINIC with multiple "No Show" appointments.  -----------------------------------------------------------------------------------------------------------------  IF YOU FEEL SUICIDAL OR HAVING THOUGHTS OR PLANS TO HURT " YOURSELF OR OTHERS, CALL 911 OR REPORT TO THE NEAREST EMERGENCY ROOM.  YOU CAN ALSO ACCESS THE FOLLOWING HOTLINE(S):    National Suicide Hotline Number 4-427-255-TALK (0601)     ([facility] Mobile Crisis, [phone]'   [facility] [place] Crisis Line, [phone]; [facility], 24 hours / 7 days, [phone] ([phone]), 2-309-465-[phone] (1920))     INSTRUCTIONS:    Taper off Lamictal: Take 1/2 of a 100 mg tab every day for 3 days, then take one 25 mg tablets for 3 days, then stop.

## 2018-12-21 ENCOUNTER — OFFICE VISIT (OUTPATIENT)
Dept: PSYCHIATRY | Facility: CLINIC | Age: 28
End: 2018-12-21
Payer: COMMERCIAL

## 2018-12-21 VITALS
HEART RATE: 86 BPM | SYSTOLIC BLOOD PRESSURE: 120 MMHG | DIASTOLIC BLOOD PRESSURE: 72 MMHG | WEIGHT: 198.94 LBS | BODY MASS INDEX: 33.96 KG/M2 | HEIGHT: 64 IN

## 2018-12-21 DIAGNOSIS — F51.02 INSOMNIA DUE TO PSYCHOLOGICAL STRESS: ICD-10-CM

## 2018-12-21 DIAGNOSIS — F31.5 BIPOLAR I DISORDER, SEVERE, CURRENT OR MOST RECENT EPISODE DEPRESSED, WITH PSYCHOTIC FEATURES, WITH ANXIOUS DISTRESS: Primary | ICD-10-CM

## 2018-12-21 PROCEDURE — 99213 OFFICE O/P EST LOW 20 MIN: CPT | Mod: S$GLB,,, | Performed by: NURSE PRACTITIONER

## 2018-12-21 PROCEDURE — 3008F BODY MASS INDEX DOCD: CPT | Mod: CPTII,S$GLB,, | Performed by: NURSE PRACTITIONER

## 2018-12-21 PROCEDURE — 99999 PR PBB SHADOW E&M-EST. PATIENT-LVL III: CPT | Mod: PBBFAC,,, | Performed by: NURSE PRACTITIONER

## 2018-12-21 PROCEDURE — 90833 PSYTX W PT W E/M 30 MIN: CPT | Mod: S$GLB,,, | Performed by: NURSE PRACTITIONER

## 2018-12-21 RX ORDER — OLANZAPINE 5 MG/1
5 TABLET ORAL NIGHTLY
Qty: 30 TABLET | Refills: 1 | Status: SHIPPED | OUTPATIENT
Start: 2018-12-21 | End: 2019-10-02

## 2018-12-21 RX ORDER — QUETIAPINE FUMARATE 50 MG/1
50 TABLET, FILM COATED ORAL NIGHTLY PRN
Qty: 30 TABLET | Refills: 0 | Status: SHIPPED | OUTPATIENT
Start: 2018-12-21 | End: 2019-08-27

## 2018-12-21 NOTE — PROGRESS NOTES
Outpatient Psychiatry Follow-Up Visit (MD/NP)    12/21/2018    Clinical Status of Patient:  Outpatient (Ambulatory)    Chief Complaint:  Marly Mayfield is a 28 y.o. female who presents today for follow-up of mood disorder, anxiety, psychosis and poor sleep.  Met with patient.      Interval History and Content of Current Session:  Interim Events/Subjective Report/Content of Current Session: Marly VALLES  was last seen by me on 12/13/2018 for an initial psychiatric evaluation. Marly VALLES was diagnosed with bipolar disorder, severe, depressed with psychotic features with anxious distress and mood insomnia and a plan of care was devised to include:    1. Safety: Call 911 or Crisis Line or go to ER for suicidal ideation, adverse effects of medication or any other emergency  2. Labs: Lipid profile, TSH, Free T3, Free T4, Urine Drug Screening.  3. Taper off Lamictal: Take 1/2 tab po qd x 3 days, then take 25 mg x 3 days, then stop.   4. Start Olanzapine 5 mg po qhs for mood, anxiety, sleep.   5. Seroquel 25 mgs to 50 mgs po qhs prn sleep.  6. Return to clinic in one week or sooner prn.     Today Marly VALLES is seen face to face. Her sleep is good. She takes the Seroquel very infrequently. Her appetite is good and it has increased.  Her energy level is much better. Her mood has improved. Her anxiety is under control. Her psychotic features are gone. She even went roberta shopping alone and she did fine in public. She is starting to decorate her roberta tree. She is not irritable. She is not crying as much. She feels much better. Her migraine headaches are decreasing in frequency and intensity. She no longer has a low libido.      Her labs are reviewed with her today.   Lab Visit on 12/13/2018   Component Date Value Ref Range Status    TSH 12/13/2018 0.941  0.400 - 4.000 uIU/mL Final    T3, Free 12/13/2018 2.8  2.3 - 4.2 pg/mL Final    Free T4 12/13/2018 0.92  0.71 - 1.51 ng/dL Final    Cholesterol 12/13/2018 175  120 - 199 mg/dL  Final    Comment: The National Cholesterol Education Program (NCEP) has set the  following guidelines (reference ranges) for Cholesterol:  Optimal.....................<200 mg/dL  Borderline High.............200-239 mg/dL  High........................> or = 240 mg/dL      Triglycerides 12/13/2018 74  30 - 150 mg/dL Final    Comment: The National Cholesterol Education Program (NCEP) has set the  following guidelines (reference values) for triglycerides:  Normal......................<150 mg/dL  Borderline High.............150-199 mg/dL  High........................200-499 mg/dL      HDL 12/13/2018 55  40 - 75 mg/dL Final    Comment: The National Cholesterol Education Program (NCEP) has set the  following guidelines (reference values) for HDL Cholesterol:  Low...............<40 mg/dL  Optimal...........>60 mg/dL      LDL Cholesterol 12/13/2018 105.2  63.0 - 159.0 mg/dL Final    Comment: The National Cholesterol Education Program (NCEP) has set the  following guidelines (reference values) for LDL Cholesterol:  Optimal.......................<130 mg/dL  Borderline High...............130-159 mg/dL  High..........................160-189 mg/dL  Very High.....................>190 mg/dL      HDL/Chol Ratio 12/13/2018 31.4  20.0 - 50.0 % Final    Total Cholesterol/HDL Ratio 12/13/2018 3.2  2.0 - 5.0 Final    Non-HDL Cholesterol 12/13/2018 120  mg/dL Final    Comment: Risk category and Non-HDL cholesterol goals:  Coronary heart disease (CHD)or equivalent (10-year risk of CHD >20%):  Non-HDL cholesterol goal     <130 mg/dL  Two or more CHD risk factors and 10-year risk of CHD <= 20%:  Non-HDL cholesterol goal     <160 mg/dL  0 to 1 CHD risk factor:  Non-HDL cholesterol goal     <190 mg/dL      Alcohol, Urine 12/13/2018 <10  <10 mg/dL Final    Benzodiazepines 12/13/2018 Negative   Final    Methadone metabolites 12/13/2018 Negative   Final    Cocaine (Metab.) 12/13/2018 Negative   Final    Opiate Scrn, Ur 12/13/2018  Presumptive Positive   Final    Barbiturate Screen, Ur 12/13/2018 Negative   Final    Amphetamine Screen, Ur 12/13/2018 Negative   Final    THC 12/13/2018 Negative   Final    Phencyclidine 12/13/2018 Negative   Final    Creatinine, Random Ur 12/13/2018 78.0  15.0 - 325.0 mg/dL Final    Comment: The random urine reference ranges provided were established   for 24 hour urine collections.  No reference ranges exist for  random urine specimens.  Correlate clinically.      Toxicology Information 12/13/2018 SEE COMMENT   Final    Comment: This screen includes the following classes of drugs at the   listed cut-off:  Benzodiazepines                  200 ng/ml  Methadone                        300 ng/ml  Cocaine metabolite               300 ng/ml  Opiates                          300 ng/ml  Barbiturates                     200 ng/ml  Amphetamines                    1000 ng/ml  Marijuana metabs (THC)            50 ng/ml  Phencyclidine (PCP)               25 ng/ml  High concentrations of Diphenhydramine may cross-react with  Phencyclidine PCP screening immunoassay giving a false   positive result.  High concentrations of Methylenedioxymethamphetamine (MDMA aka  Ectasy) and other structurally similar compounds may cross-   react with the Amphetamine/Methamphetamine screening   immunoassay giving a false positive result.  A metabolite of the anti-HIV drug Sustiva () may cause  false positive results in the Marijuana metabolite (THC)   screening assay.  Note: This exception list includes only more common   interferants i                           n toxicology screen testing.  Because of many   cross-reactantspositive results on toxicology drug screens   should be confirmed whenever results do not correlate with   clinical presentation.  This report is intended for use in clinical monitoring and  management of patients. It is not intended for use in   employment related drug testing.  Because of any cross-reactants,  positive results on toxicology  drug screens should be confirmed whenever results do not  correlate with clinical presentation.  Presumptive positive results are unconfirmed and may be used   only for medical purposes.      Hemoglobin A1C 12/13/2018 5.0  4.0 - 5.6 % Final    Comment: ADA Screening Guidelines:  5.7-6.4%  Consistent with prediabetes  >or=6.5%  Consistent with diabetes  High levels of fetal hemoglobin interfere with the HbA1C  assay. Heterozygous hemoglobin variants (HbS, HgC, etc)do  not significantly interfere with this assay.   However, presence of multiple variants may affect accuracy.      Estimated Avg Glucose 12/13/2018 97  68 - 131 mg/dL Final   ]    What is scheduled as a 30 minute visit actually takes 45 minutes with greater than 50 % of time spent in psychotherapy.   Psychotherapy:  · Target symptoms: anxiety , mood disorder, psychosis, poor sleep  · Why chosen therapy is appropriate versus another modality: relevant to diagnosis, evidence based practice  · Outcome monitoring methods: self-report, observation  · Therapeutic intervention type: supportive psychotherapy  · Topics discussed/themes: [place] holidays and participation, improved mood, medications, diet and exercise, GI problems and following up with PCP.   · The patient's response to the intervention is accepting. The patient's progress toward treatment goals is good.   · Duration of intervention: 26 minutes.    Review of Systems   PSYCHIATRIC: Pertinant items are noted in the narrative.  GENERAL:  Obese female  SKIN:  No rashes or lacerations  HEAD:  Has migraine headaches but they are decreasing in intensity (has migraines and is seen by PCP for these).  EYES:  No exophthalmos, jaundice or blindness  EARS:  No dizziness, tinnitus or hearing loss  MOUTH & THROAT:  No dyskinetic movements or obvious goiter; has difficulty swallowing at times. Instructed to FU with PCP.  CHEST:  No shortness of breath, hyperventilation or  "cough  CARDIOVASCULAR:  No tachycardia or chest pain  ABDOMEN:  No nausea, vomiting, or pain; has chronic constipation and diarrhea. States takes stool softeners and laxatives a lot. Instructed on addictive qualities of laxatives and to FU with PCP regarding this (has appt. day after [place]).  URINARY:  No frequency, or dysuria; no longer has a low libido  MUSCULOSKELETAL:  No pain or stiffness of the joints  NEUROLOGIC:  No abnormal movements  Past Medical, Family and Social History: The patient's past medical, family and social history have been reviewed and updated as appropriate within the electronic medical record - see encounter notes.    Compliance: yes    Side effects: None    Risk Parameters:  Patient reports no suicidal ideation  Patient reports no homicidal ideation  Patient reports no self-injurious behavior  Patient reports no violent behavior    Exam (detailed: at least 9 elements; comprehensive: all 15 elements)   Constitutional  Vitals:  Most recent vital signs, dated less than 90 days prior to this appointment, were reviewed.   Vitals:    12/21/18 0922   BP: 120/72   Pulse: 86   Weight: 90.2 kg (198 lb 15.4 oz)   Height: 5' 4" (1.626 m)        General:  unremarkable, age appropriate     Musculoskeletal  Muscle Strength/Tone:  no tremor, no tic   Gait & Station:  non-ataxic     Psychiatric  Speech:  no latency; no press   Mood & Affect:  " I'm actually in a good mood"  congruent and appropriate   Thought Process:  normal and logical   Associations:  intact   Thought Content:  normal, no suicidality, no homicidality, delusions, or paranoia   Insight:  has awareness of illness   Judgement: behavior is adequate to circumstances   Orientation:  grossly intact   Memory: intact for content of interview   Language: grossly intact   Attention Span & Concentration:  able to focus   Fund of Knowledge:  intact and appropriate to age and level of education     Assessment and Diagnosis   Status/Progress: Based " on the examination today, the patient's problem(s) is/are improved.  New problems have not been presented today.   Lack of compliance are not complicating management of the primary condition.  There are no active rule-out diagnoses for this patient at this time.     General Impression: She has improved.     ICD-10-CM ICD-9-CM   1. Bipolar I disorder, severe, current or most recent episode depressed, with psychotic features, with anxious distress F31.5 296.54   2. Insomnia due to psychological stress F51.02 307.41       Intervention/Counseling/Treatment Plan   · Medication Management: The risks and benefits of medication were discussed with the patient.  · The treatment plan and follow up plan were reviewed with the patient.   1. Safety: Call 911 or Crisis Line or go to ER for suicidal ideation, adverse effects of medication or any other emergency  2. Olanzapine 5 mg po qhs for mood, anxiety, sleep. C  3. Seroquel 25 mgs to 50 mgs po qhs prn sleep.  4. Return to clinic in 2 months or sooner prn.     Patient agrees with POC.    INSTRUCTIONS  Instructed to call 911 or Crisis Line or go to ER for suicidal ideation, adverse effects of medication or any other emergency. Verbalizes understanding and plan to comply.    Return to Clinic: 2 months, as needed

## 2018-12-21 NOTE — PATIENT INSTRUCTIONS
"You have been provided with a certain amount of medication with a specified number of refills.  Please follow up within an adequate time before you run out of medications.    REFILLS FOR CONTROLLED SUBSTANCES WILL NOT BE GIVEN WITHOUT AN APPOINTMENT.  I will not honor or fill automated refill requests from pharmacies.  You must come in for an appointment to get refills.    Please book your next appointment for myself or therapist by phone by calling our office at [phone].      PLEASE BE AT LEAST 15 MINUTES EARLY FOR YOUR NEXT APPOINTMENT.  PLEASE, DO NOT BE LATE OR YOU WILL BE TURNED AWAY AND ASKED TO RESCHEDULE.  YOU MUST COME EARLY TO ALLOW TIME FOR CHECK-IN AS WELL AS GET YOUR VITAL SIGNS AND GO OVER YOUR MEDICATIONS.  Tardiness is not fair to the patients who present after you and are on time for their appointments.  It causes a delay in the appointments for patients and staff.  IF YOU ARE LATE, THERE IS A POSSIBILITY THAT YOU WILL BE CHARGED FOR THE APPOINTMENT TIME PERSONALLY AND IT WILL NOT GO TO YOUR INSURANCE.  YOU MAY ALSO BE DISCHARGED FROM CLINIC with multiple "No Show" appointments.  -----------------------------------------------------------------------------------------------------------------  IF YOU FEEL SUICIDAL OR HAVING THOUGHTS OR PLANS TO HURT YOURSELF OR OTHERS, CALL 911 OR REPORT TO THE NEAREST EMERGENCY ROOM.  YOU CAN ALSO ACCESS THE FOLLOWING HOTLINE(S):    National Suicide Hotline Number 2-447-170-TALK (7069)     ([facility] Mobile Crisis, [phone]'   [facility], [phone]; [facility], 24 hours / 7 days, [phone] ([phone]), [phone] ([phone]))     "

## 2019-01-28 ENCOUNTER — OFFICE VISIT (OUTPATIENT)
Dept: FAMILY MEDICINE | Facility: CLINIC | Age: 29
End: 2019-01-28
Payer: COMMERCIAL

## 2019-01-28 VITALS
SYSTOLIC BLOOD PRESSURE: 133 MMHG | OXYGEN SATURATION: 98 % | TEMPERATURE: 98 F | DIASTOLIC BLOOD PRESSURE: 83 MMHG | HEIGHT: 64 IN | BODY MASS INDEX: 35.87 KG/M2 | RESPIRATION RATE: 16 BRPM | WEIGHT: 210.13 LBS | HEART RATE: 88 BPM

## 2019-01-28 DIAGNOSIS — F31.9 BIPOLAR DEPRESSION: ICD-10-CM

## 2019-01-28 DIAGNOSIS — G43.009 MIGRAINE WITHOUT AURA AND WITHOUT STATUS MIGRAINOSUS, NOT INTRACTABLE: Primary | ICD-10-CM

## 2019-01-28 PROCEDURE — 99999 PR PBB SHADOW E&M-EST. PATIENT-LVL IV: ICD-10-PCS | Mod: PBBFAC,,, | Performed by: FAMILY MEDICINE

## 2019-01-28 PROCEDURE — 3008F PR BODY MASS INDEX (BMI) DOCUMENTED: ICD-10-PCS | Mod: CPTII,S$GLB,, | Performed by: FAMILY MEDICINE

## 2019-01-28 PROCEDURE — 99214 PR OFFICE/OUTPT VISIT, EST, LEVL IV, 30-39 MIN: ICD-10-PCS | Mod: S$GLB,,, | Performed by: FAMILY MEDICINE

## 2019-01-28 PROCEDURE — 99999 PR PBB SHADOW E&M-EST. PATIENT-LVL IV: CPT | Mod: PBBFAC,,, | Performed by: FAMILY MEDICINE

## 2019-01-28 PROCEDURE — 3008F BODY MASS INDEX DOCD: CPT | Mod: CPTII,S$GLB,, | Performed by: FAMILY MEDICINE

## 2019-01-28 PROCEDURE — 99214 OFFICE O/P EST MOD 30 MIN: CPT | Mod: S$GLB,,, | Performed by: FAMILY MEDICINE

## 2019-01-28 RX ORDER — TRAMADOL HYDROCHLORIDE 50 MG/1
50 TABLET ORAL EVERY 6 HOURS PRN
Qty: 4 TABLET | Refills: 0 | Status: SHIPPED | OUTPATIENT
Start: 2019-01-28 | End: 2019-01-29

## 2019-01-28 NOTE — PATIENT INSTRUCTIONS
Migraines and Cluster Headaches  Migraines and cluster headaches cause intense, throbbing pain on one side of the head. With a migraine, you may have nausea and vomiting and be sensitive to light and sound. You may also have warning signs, such as flashing lights or loss of parts of your vision, before the pain starts. Migraines are three times more common in women than men. This may be due to hormonal changes during menstruation. Typical migrains may last for 4 to 72 hours untreated.  Cluster headaches recur in groups for days, weeks, or months. The pain is centered around or behind one eye. The eye may also become red or teary, or the eyelid may droop. Migraines and cluster headaches can have many triggers.    Preventing migraines and cluster headaches  Try the following steps:  · Avoid aged cheeses, nuts, beans, chocolate, red wine, or foods that contain caffeine, alcohol, tobacco, nitrates, and MSG.  · Try not to skip meals.  · Dont work in poor lighting.  · Reduce stress as much as you can.  · Get plenty of sleep each night.  · Exercise regularly under your doctors guidance.  · Avoid taking headache medicines for more than 3 days, because of the risk of rebound headaches.  Relieving the pain  Try these suggestions:  · Stay quiet and rest.  · Use cold to numb the pain. Wrap ice or a cold can of soda in a cloth. Hold it against the site of pain for 10 minutes. Repeat every 20 minutes.  · Avoid light. Wear dark glasses, turn out lights, and close the curtains. When outdoors, wear a brimmed hat.  · Drink lots of fluids. Sip caffeine-free flat soda to help relieve nausea.  · See your doctor if you get migraines or cluster headaches often. There are effective medications to help treat or prevent them.  · Hormone therapy. This may help women whose migraines are related to hormonal changes during menstruation.  Date Last Reviewed: 10/19/2015  © 6924-9418 [employer] [address] [place] [address]  [place] [address]. All rights reserved. This information is not intended as a substitute for professional medical care. Always follow your healthcare professional's instructions.

## 2019-01-28 NOTE — PROGRESS NOTES
Chief Complaint   Patient presents with    Migraine       HPI    Marly Mayfield is 28 y.o. female. The primary encounter diagnosis was Migraine without aura and without status migrainosus, not intractable. A diagnosis of Bipolar depression was also pertinent to this visit.    28 year old female with Bipolar disorder and Migraine headaches comes to clinic with complaint of severe migraine headache pain.      Bipolar - Off of Zyprexa for the last week. She reports improvement with this medication but was unhappy with weight gain.    Migraines - she reports having fewer migraines of less intensity and shorter.  She reports current headache has been present for 4 days.  She attributes this to suddenly stopping her Zyprexa 1 week ago.  Her current symptoms include photophobia, with nausea and dizziness.  The pain is located in the bitemporal area.  She has tried multiple medications including Sumatriptan, Propranolol, Fioricet, Excedrin migraine, BC, and Tylenol without any reduction in symptoms.    Review of Systems   Constitutional: Positive for unexpected weight change. Negative for activity change.   HENT: Negative for hearing loss, rhinorrhea and trouble swallowing.    Eyes: Negative for discharge and visual disturbance.   Respiratory: Negative for chest tightness and wheezing.    Cardiovascular: Negative for chest pain and palpitations.   Gastrointestinal: Negative for blood in stool, constipation, diarrhea and vomiting.   Endocrine: Negative for polydipsia and polyuria.   Genitourinary: Negative for difficulty urinating, dysuria, hematuria and menstrual problem.   Musculoskeletal: Positive for neck pain. Negative for arthralgias and joint swelling.   Neurological: Positive for headaches. Negative for weakness.   Psychiatric/Behavioral: Positive for dysphoric mood. Negative for confusion.           Current Outpatient Medications:     medroxyprogesterone acetate (DEPO-PROVERA IM), Inject into the muscle., Disp: ,  "Rfl:     OLANZapine (ZYPREXA) 5 MG tablet, Take 1 tablet (5 mg total) by mouth every evening., Disp: 30 tablet, Rfl: 1    propranolol (INNOPRAN XL) 80 mg 24 hr capsule, Take 1 capsule (80 mg total) by mouth nightly., Disp: 90 capsule, Rfl: 1    QUEtiapine (SEROQUEL) 50 MG tablet, Take 1 tablet (50 mg total) by mouth nightly as needed., Disp: 30 tablet, Rfl: 0    traMADol (ULTRAM) 50 mg tablet, Take 1 tablet (50 mg total) by mouth every 6 (six) hours as needed for Pain., Disp: 4 tablet, Rfl: 0      Blood pressure 133/83, pulse 88, temperature 98.4 °F (36.9 °C), temperature source Oral, resp. rate 16, height 5' 4" (1.626 m), weight 95.3 kg (210 lb 1.6 oz), SpO2 98 %.    Physical Exam   Constitutional: She is oriented to person, place, and time. Vital signs are normal. She appears well-developed and well-nourished. She does not appear ill. No distress.   HENT:   Head: Head is without right periorbital erythema and without left periorbital erythema.   Right Ear: No tenderness. Tympanic membrane is not bulging. No middle ear effusion.   Left Ear: No tenderness. Tympanic membrane is bulging.  No middle ear effusion.   Nose: Nose normal.   Mouth/Throat: No oropharyngeal exudate, posterior oropharyngeal edema or posterior oropharyngeal erythema.   Cardiovascular: Normal heart sounds.   No murmur heard.  Pulmonary/Chest: Effort normal and breath sounds normal.   Neurological: She is alert and oriented to person, place, and time. No cranial nerve deficit or sensory deficit.   Psychiatric: She has a normal mood and affect. Her speech is normal and behavior is normal. Judgment and thought content normal. Cognition and memory are normal.       Lab Visit on 12/13/2018   Component Date Value Ref Range Status    TSH 12/13/2018 0.941  0.400 - 4.000 uIU/mL Final    T3, Free 12/13/2018 2.8  2.3 - 4.2 pg/mL Final    Free T4 12/13/2018 0.92  0.71 - 1.51 ng/dL Final    Cholesterol 12/13/2018 175  120 - 199 mg/dL Final    " Triglycerides 12/13/2018 74  30 - 150 mg/dL Final    HDL 12/13/2018 55  40 - 75 mg/dL Final    LDL Cholesterol 12/13/2018 105.2  63.0 - 159.0 mg/dL Final    HDL/Chol Ratio 12/13/2018 31.4  20.0 - 50.0 % Final    Total Cholesterol/HDL Ratio 12/13/2018 3.2  2.0 - 5.0 Final    Non-HDL Cholesterol 12/13/2018 120  mg/dL Final    Alcohol, Urine 12/13/2018 <10  <10 mg/dL Final    Benzodiazepines 12/13/2018 Negative   Final    Methadone metabolites 12/13/2018 Negative   Final    Cocaine (Metab.) 12/13/2018 Negative   Final    Opiate Scrn, Ur 12/13/2018 Presumptive Positive   Final    Barbiturate Screen, Ur 12/13/2018 Negative   Final    Amphetamine Screen, Ur 12/13/2018 Negative   Final    THC 12/13/2018 Negative   Final    Phencyclidine 12/13/2018 Negative   Final    Creatinine, Random Ur 12/13/2018 78.0  15.0 - 325.0 mg/dL Final    Toxicology Information 12/13/2018 SEE COMMENT   Final    Hemoglobin A1C 12/13/2018 5.0  4.0 - 5.6 % Final    Estimated Avg Glucose 12/13/2018 97  68 - 131 mg/dL Final   Admission on 11/02/2018, Discharged on 11/02/2018   Component Date Value Ref Range Status    Specimen UA 11/02/2018 Urine, Clean Catch   Final    Color, UA 11/02/2018 Yellow  Yellow, Straw, Radha Final    Appearance, UA 11/02/2018 Hazy* Clear Final    pH, UA 11/02/2018 6.0  5.0 - 8.0 Final    Specific Gravity, UA 11/02/2018 1.020  1.005 - 1.030 Final    Protein, UA 11/02/2018 Negative  Negative Final    Glucose, UA 11/02/2018 Negative  Negative Final    Ketones, UA 11/02/2018 Negative  Negative Final    Bilirubin (UA) 11/02/2018 Negative  Negative Final    Occult Blood UA 11/02/2018 3+* Negative Final    Nitrite, UA 11/02/2018 Negative  Negative Final    Urobilinogen, UA 11/02/2018 Negative  <2.0 EU/dL Final    Leukocytes, UA 11/02/2018 Negative  Negative Final    POC Preg Test, Ur 11/02/2018 Negative  Negative Final     Acceptable 11/02/2018 Yes   Final    WBC 11/02/2018 4.84   3.90 - 12.70 K/uL Final    RBC 11/02/2018 4.12  4.00 - 5.40 M/uL Final    Hemoglobin 11/02/2018 12.8  12.0 - 16.0 g/dL Final    Hematocrit 11/02/2018 38.7  37.0 - 48.5 % Final    MCV 11/02/2018 94  82 - 98 fL Final    MCH 11/02/2018 31.1* 27.0 - 31.0 pg Final    MCHC 11/02/2018 33.1  32.0 - 36.0 g/dL Final    RDW 11/02/2018 12.8  11.5 - 14.5 % Final    Platelets 11/02/2018 268  150 - 350 K/uL Final    MPV 11/02/2018 10.8  9.2 - 12.9 fL Final    Gran # (ANC) 11/02/2018 2.2  1.8 - 7.7 K/uL Final    Lymph # 11/02/2018 2.0  1.0 - 4.8 K/uL Final    Mono # 11/02/2018 0.3  0.3 - 1.0 K/uL Final    Eos # 11/02/2018 0.2  0.0 - 0.5 K/uL Final    Baso # 11/02/2018 0.03  0.00 - 0.20 K/uL Final    Gran% 11/02/2018 46.4  38.0 - 73.0 % Final    Lymph% 11/02/2018 41.9  18.0 - 48.0 % Final    Mono% 11/02/2018 6.8  4.0 - 15.0 % Final    Eosinophil% 11/02/2018 4.3  0.0 - 8.0 % Final    Basophil% 11/02/2018 0.6  0.0 - 1.9 % Final    Differential Method 11/02/2018 Automated   Final    Sodium 11/02/2018 140  136 - 145 mmol/L Final    Potassium 11/02/2018 3.7  3.5 - 5.1 mmol/L Final    Chloride 11/02/2018 108  95 - 110 mmol/L Final    CO2 11/02/2018 21* 23 - 29 mmol/L Final    Glucose 11/02/2018 87  70 - 110 mg/dL Final    BUN, Bld 11/02/2018 9  6 - 20 mg/dL Final    Creatinine 11/02/2018 0.8  0.5 - 1.4 mg/dL Final    Calcium 11/02/2018 9.5  8.7 - 10.5 mg/dL Final    Total Protein 11/02/2018 7.3  6.0 - 8.4 g/dL Final    Albumin 11/02/2018 4.6  3.5 - 5.2 g/dL Final    Total Bilirubin 11/02/2018 0.6  0.1 - 1.0 mg/dL Final    Alkaline Phosphatase 11/02/2018 55  55 - 135 U/L Final    AST 11/02/2018 16  10 - 40 U/L Final    ALT 11/02/2018 14  10 - 44 U/L Final    Anion Gap 11/02/2018 11  8 - 16 mmol/L Final    eGFR if African American 11/02/2018 >60  >60 mL/min/1.73 m^2 Final    eGFR if non African American 11/02/2018 >60  >60 mL/min/1.73 m^2 Final    RBC, UA 11/02/2018 >100* 0 - 4 /hpf Final    WBC, UA  11/02/2018 0  0 - 5 /hpf Final    Bacteria, UA 11/02/2018 None  None-Occ /hpf Final    Squam Epithel, UA 11/02/2018 5  /hpf Final    Microscopic Comment 11/02/2018 SEE COMMENT   Final   ]    Assessment:    1. Migraine without aura and without status migrainosus, not intractable    2. Bipolar depression          Marly VALLES was seen today for migraine.    Diagnoses and all orders for this visit:    Migraine without aura and without status migrainosus, not intractable  -     traMADol (ULTRAM) 50 mg tablet; Take 1 tablet (50 mg total) by mouth every 6 (six) hours as needed for Pain.  - Unstable. Trial of Tramadol to reduce pain every 6 hours for 24 hours then to Sumatriptan as needed.  - Patient instructed that Propranolol use is daily.  - Headache likely triggered by sudden discontinuation of Zyprex. Consider resuming a low dose with a reassessment by Psychiatry.    Bipolar depression   -Unstable. Patient has stopped recommended regimen. Follow up with Psychiatry on alternatives and resuming medications.        FOLLOW UP: Follow-up in about 2 days (around 1/30/2019), or if symptoms worsen or fail to improve.

## 2019-06-03 DIAGNOSIS — F41.9 ANXIETY: ICD-10-CM

## 2019-06-04 RX ORDER — QUETIAPINE FUMARATE 25 MG/1
TABLET, FILM COATED ORAL
Qty: 30 TABLET | Refills: 0 | OUTPATIENT
Start: 2019-06-04

## 2019-08-27 ENCOUNTER — OFFICE VISIT (OUTPATIENT)
Dept: OBSTETRICS AND GYNECOLOGY | Facility: CLINIC | Age: 29
End: 2019-08-27
Payer: COMMERCIAL

## 2019-08-27 VITALS
HEIGHT: 64 IN | DIASTOLIC BLOOD PRESSURE: 70 MMHG | SYSTOLIC BLOOD PRESSURE: 118 MMHG | WEIGHT: 216 LBS | BODY MASS INDEX: 36.88 KG/M2

## 2019-08-27 DIAGNOSIS — Z01.419 ENCOUNTER FOR GYNECOLOGICAL EXAMINATION WITHOUT ABNORMAL FINDING: ICD-10-CM

## 2019-08-27 DIAGNOSIS — N97.9 FEMALE FERTILITY PROBLEMS: ICD-10-CM

## 2019-08-27 DIAGNOSIS — E28.9 OVARIAN DYSFUNCTION, UNSPECIFIED: ICD-10-CM

## 2019-08-27 DIAGNOSIS — Z00.00 ANNUAL PHYSICAL EXAM: Primary | ICD-10-CM

## 2019-08-27 PROCEDURE — 99999 PR PBB SHADOW E&M-EST. PATIENT-LVL III: ICD-10-PCS | Mod: PBBFAC,,, | Performed by: OBSTETRICS & GYNECOLOGY

## 2019-08-27 PROCEDURE — 88141 LIQUID-BASED PAP SMEAR, SCREENING: ICD-10-PCS | Mod: ,,, | Performed by: PATHOLOGY

## 2019-08-27 PROCEDURE — 99385 PREV VISIT NEW AGE 18-39: CPT | Mod: S$GLB,,, | Performed by: OBSTETRICS & GYNECOLOGY

## 2019-08-27 PROCEDURE — 3008F BODY MASS INDEX DOCD: CPT | Mod: CPTII,S$GLB,, | Performed by: OBSTETRICS & GYNECOLOGY

## 2019-08-27 PROCEDURE — 99999 PR PBB SHADOW E&M-EST. PATIENT-LVL III: CPT | Mod: PBBFAC,,, | Performed by: OBSTETRICS & GYNECOLOGY

## 2019-08-27 PROCEDURE — 3008F PR BODY MASS INDEX (BMI) DOCUMENTED: ICD-10-PCS | Mod: CPTII,S$GLB,, | Performed by: OBSTETRICS & GYNECOLOGY

## 2019-08-27 PROCEDURE — 88141 CYTOPATH C/V INTERPRET: CPT | Mod: ,,, | Performed by: PATHOLOGY

## 2019-08-27 PROCEDURE — 88142 CYTOPATH C/V THIN LAYER: CPT | Performed by: PATHOLOGY

## 2019-08-27 PROCEDURE — 99385 PR PREVENTIVE VISIT,NEW,18-39: ICD-10-PCS | Mod: S$GLB,,, | Performed by: OBSTETRICS & GYNECOLOGY

## 2019-08-27 RX ORDER — CLOMIPHENE CITRATE 50 MG/1
50 TABLET ORAL DAILY
Qty: 5 TABLET | Refills: 2 | Status: SHIPPED | OUTPATIENT
Start: 2019-08-27 | End: 2019-09-01

## 2019-08-27 NOTE — PROGRESS NOTES
Subjective:      Chief Complaint:    Chief Complaint   Patient presents with    Consult     infertility       Menstrual History:    OB History        2    Para   1    Term   1            AB        Living   1       SAB        TAB        Ectopic        Multiple        Live Births   1                 Menarche age: 13     No LMP recorded. Patient has had an injection.                Objective:      HISTORY OF PRESENT ILLNESS:  The patient is 29 years of age, an old patient to   me.  The patient is  3, para 2.  Pap smear last in  was negative.    Apparently, the patient had an ultrasound in 2018, was found to have a   possible polyp or intrauterine fibroids.    PAST MEDICAL HISTORY:  History of chlamydia, psychiatric problems, substance   abuse, bipolar and possible history of PID.    The patient's cycle is monthly but is irregular, varies between 21 and 28 days.    Bleeding is moderate.  The patient also had a D and C by Dr. Labadie because of   the possible endometrial polyps.  The patient is interested in fertility.    Apparently, she is trying to get pregnant and has not occurred yet.    PHYSICAL EXAMINATION:  VITAL SIGNS:  Blood pressure 118/70, weight 216.  PELVIC:  External normal.  Vulva normal.  Bartholin, urethral and Denair   negative.  Vagina is clear.  Cervix is clear.  Uterus is normal size, shape and   position.  Both ovaries are palpable and normal.  No descensus.  Good pelvic   support and no pain.  RECTAL:  Negative.    IMPRESSION:  Possible mild ovarian dysfunction.  Since the patient has used   Clomid before with success, we will initiate Clomid with her next cycles.  Pros   and cons were discussed.    PLAN:  We will start Clomid with her next cycle on the fifth day, one every day.    Continue with normal sexual activity and we will follow the patient for   pregnancy.      LAUREANO  dd: 2019 10:24:27 (CDT)  td: 2019 02:57:20 (CDT)  Doc ID   #[id number]  Job ID  #[id number]    CC:       History of Present Illness AND  Examination detailed DICTATE:        Physical Exam   Constitutional: She is oriented to person, place, and time. She appears well-developed and well-nourished. No distress.   HENT:   Head: Normocephal  Eyes: Pupils are equal, round, and reactive to light.   Neck: Neck supple. .   Cardiovascular: Normal rate, regular rhythm and normal heart sounds. No murmur heard.  Pulmonary/Chest: Effort normal and breath sounds normal. No respiratory distress. She has no wheezes. She has no rales. She exhibits no tenderness.   Abdominal: Bowel sounds are normal. She exhibits no distension and no mass. There is no tenderness. There is no rebound and no guarding..   Musculoskeletal: Normal range of motion.   Lymphadenopathy:        Right: No inguinal adenopathy present.        Left: No inguinal adenopathy present.   Neurological: She is alert and oriented.  Skin: Skin is warm. No rash noted.        Review of Systems  Review of Systems   Normal ROS:   Constitutional: Negative for fever, chills, activity change fatigue and unexpected weight change.   HENT: Negative for nosebleeds, congestion.  Eyes: Negative for visual disturbance.   Respiratory: Negative for shortness of breath and wheezing.    Cardiovascular: Negative for chest pain, palpitations.   Gastrointestinal: Negative for abdominal pain, diarrhea, constipation, blood in stool and abdominal distention.   Musculoskeletal: Negative for back pain.   Allergic/Immunologic: Negative for environmental allergies and food allergies.   Neurological: Negative for seizures, syncope, weakness, numbness and headaches.   Hematological: Negative for adenopathy. Does not bruise/bleed easily.   Psychiatric/Behavioral: BIPOLAR   PSYCHIATRIC   PROBLEM.    Assessment:      Diagnosis: ANNUAL   EXAM   FERTILITY  PROBLEM   OVARIAN   DYSFUNCTION  PAP       Plan:      Return in 6  weeks

## 2019-08-27 NOTE — PATIENT INSTRUCTIONS

## 2019-10-02 ENCOUNTER — HOSPITAL ENCOUNTER (EMERGENCY)
Facility: HOSPITAL | Age: 29
Discharge: HOME | End: 2019-10-02
Attending: EMERGENCY MEDICINE
Payer: COMMERCIAL

## 2019-10-02 VITALS
OXYGEN SATURATION: 100 % | SYSTOLIC BLOOD PRESSURE: 118 MMHG | RESPIRATION RATE: 16 BRPM | HEART RATE: 77 BPM | HEIGHT: 64 IN | DIASTOLIC BLOOD PRESSURE: 69 MMHG | TEMPERATURE: 98 F | WEIGHT: 218 LBS | BODY MASS INDEX: 37.22 KG/M2

## 2019-10-02 DIAGNOSIS — O26.899 ABDOMINAL PAIN IN PREGNANCY: ICD-10-CM

## 2019-10-02 DIAGNOSIS — R10.9 ABDOMINAL PAIN IN PREGNANCY: ICD-10-CM

## 2019-10-02 DIAGNOSIS — Z3A.01 LESS THAN 8 WEEKS GESTATION OF PREGNANCY: Primary | ICD-10-CM

## 2019-10-02 LAB
ABO + RH BLD: NORMAL
ALBUMIN SERPL-MCNC: 4.3 G/DL (ref 3.3–5.5)
ALP SERPL-CCNC: 69 U/L (ref 42–141)
B-HCG UR QL: POSITIVE
BILIRUB SERPL-MCNC: 0.7 MG/DL (ref 0.2–1.6)
BILIRUBIN, POC UA: NEGATIVE
BLOOD, POC UA: NEGATIVE
BUN SERPL-MCNC: 7 MG/DL (ref 7–22)
CALCIUM SERPL-MCNC: 9.6 MG/DL (ref 8–10.3)
CHLORIDE SERPL-SCNC: 104 MMOL/L (ref 98–108)
CLARITY, POC UA: CLEAR
COLOR, POC UA: YELLOW
CREAT SERPL-MCNC: 0.5 MG/DL (ref 0.6–1.2)
CTP QC/QA: YES
GLUCOSE SERPL-MCNC: 87 MG/DL (ref 73–118)
GLUCOSE, POC UA: NEGATIVE
HCG INTACT+B SERPL-ACNC: NORMAL MIU/ML
KETONES, POC UA: NEGATIVE
LEUKOCYTE EST, POC UA: NEGATIVE
NITRITE, POC UA: NEGATIVE
PH UR STRIP: 8 [PH]
POC ALT (SGPT): 35 U/L (ref 10–47)
POC AST (SGOT): 29 U/L (ref 11–38)
POC BETA-HCG (QUANT): >2000 IU/L
POC TCO2: 25 MMOL/L (ref 18–33)
POTASSIUM BLD-SCNC: 3.8 MMOL/L (ref 3.6–5.1)
PROTEIN, POC UA: NEGATIVE
PROTEIN, POC: 7.2 G/DL (ref 6.4–8.1)
SAMPLE: NORMAL
SODIUM BLD-SCNC: 139 MMOL/L (ref 128–145)
SPECIFIC GRAVITY, POC UA: 1.01
UROBILINOGEN, POC UA: 0.2 E.U./DL

## 2019-10-02 PROCEDURE — 87661 TRICHOMONAS VAGINALIS AMPLIF: CPT

## 2019-10-02 PROCEDURE — 96361 HYDRATE IV INFUSION ADD-ON: CPT | Mod: ER

## 2019-10-02 PROCEDURE — 96365 THER/PROPH/DIAG IV INF INIT: CPT | Mod: ER

## 2019-10-02 PROCEDURE — 81003 URINALYSIS AUTO W/O SCOPE: CPT | Mod: ER

## 2019-10-02 PROCEDURE — 87801 DETECT AGNT MULT DNA AMPLI: CPT

## 2019-10-02 PROCEDURE — 87481 CANDIDA DNA AMP PROBE: CPT | Mod: 59

## 2019-10-02 PROCEDURE — 25000003 PHARM REV CODE 250: Mod: ER | Performed by: EMERGENCY MEDICINE

## 2019-10-02 PROCEDURE — 86901 BLOOD TYPING SEROLOGIC RH(D): CPT

## 2019-10-02 PROCEDURE — 81025 URINE PREGNANCY TEST: CPT | Mod: ER | Performed by: EMERGENCY MEDICINE

## 2019-10-02 PROCEDURE — 99284 EMERGENCY DEPT VISIT MOD MDM: CPT | Mod: 25,ER

## 2019-10-02 PROCEDURE — 80053 COMPREHEN METABOLIC PANEL: CPT | Mod: ER

## 2019-10-02 PROCEDURE — 84702 CHORIONIC GONADOTROPIN TEST: CPT | Mod: ER

## 2019-10-02 PROCEDURE — 96375 TX/PRO/DX INJ NEW DRUG ADDON: CPT | Mod: ER

## 2019-10-02 PROCEDURE — 63600175 PHARM REV CODE 636 W HCPCS: Mod: ER | Performed by: EMERGENCY MEDICINE

## 2019-10-02 PROCEDURE — 85025 COMPLETE CBC W/AUTO DIFF WBC: CPT | Mod: ER

## 2019-10-02 PROCEDURE — 84702 CHORIONIC GONADOTROPIN TEST: CPT

## 2019-10-02 PROCEDURE — 87491 CHLMYD TRACH DNA AMP PROBE: CPT

## 2019-10-02 RX ORDER — AZITHROMYCIN 250 MG/1
1000 TABLET, FILM COATED ORAL
Status: COMPLETED | OUTPATIENT
Start: 2019-10-02 | End: 2019-10-02

## 2019-10-02 RX ORDER — ACETAMINOPHEN 10 MG/ML
1000 INJECTION, SOLUTION INTRAVENOUS ONCE
Status: COMPLETED | OUTPATIENT
Start: 2019-10-02 | End: 2019-10-02

## 2019-10-02 RX ORDER — CEFTRIAXONE 250 MG/1
250 INJECTION, POWDER, FOR SOLUTION INTRAMUSCULAR; INTRAVENOUS
Status: COMPLETED | OUTPATIENT
Start: 2019-10-02 | End: 2019-10-02

## 2019-10-02 RX ORDER — PROMETHAZINE HYDROCHLORIDE 25 MG/1
25 SUPPOSITORY RECTAL EVERY 6 HOURS PRN
Qty: 10 SUPPOSITORY | Refills: 0 | Status: SHIPPED | OUTPATIENT
Start: 2019-10-02 | End: 2021-04-09 | Stop reason: ALTCHOICE

## 2019-10-02 RX ORDER — ONDANSETRON 2 MG/ML
8 INJECTION INTRAMUSCULAR; INTRAVENOUS
Status: COMPLETED | OUTPATIENT
Start: 2019-10-02 | End: 2019-10-02

## 2019-10-02 RX ORDER — ONDANSETRON 8 MG/1
8 TABLET, ORALLY DISINTEGRATING ORAL EVERY 12 HOURS PRN
Qty: 10 TABLET | Refills: 0 | Status: SHIPPED | OUTPATIENT
Start: 2019-10-02 | End: 2019-10-04

## 2019-10-02 RX ORDER — ONDANSETRON 4 MG/1
4 TABLET, FILM COATED ORAL 2 TIMES DAILY
COMMUNITY
End: 2021-04-09 | Stop reason: ALTCHOICE

## 2019-10-02 RX ORDER — ACETAMINOPHEN 500 MG
1000 TABLET ORAL EVERY 6 HOURS PRN
Qty: 30 TABLET | Refills: 0 | OUTPATIENT
Start: 2019-10-02 | End: 2021-01-28

## 2019-10-02 RX ADMIN — SODIUM CHLORIDE 1000 ML: 0.9 INJECTION, SOLUTION INTRAVENOUS at 10:10

## 2019-10-02 RX ADMIN — CEFTRIAXONE SODIUM 250 MG: 250 INJECTION, POWDER, FOR SOLUTION INTRAMUSCULAR; INTRAVENOUS at 10:10

## 2019-10-02 RX ADMIN — ACETAMINOPHEN 1000 MG: 10 INJECTION, SOLUTION INTRAVENOUS at 10:10

## 2019-10-02 RX ADMIN — AZITHROMYCIN MONOHYDRATE 1000 MG: 250 TABLET ORAL at 12:10

## 2019-10-02 RX ADMIN — ONDANSETRON 8 MG: 2 INJECTION INTRAMUSCULAR; INTRAVENOUS at 10:10

## 2019-10-02 NOTE — DISCHARGE INSTRUCTIONS
Ultrasound shows Single living intrauterine fetus with a crown-rump length of 0.959 cm, which corresponds to a sonographic age of 7 weeks, 1 day.  Unremarkable gestational and yolk sacs.  The fetal heart rate is 139 beats per minute.  Please follow up with her OBGYN in 1-2 days.  Go directly to emergency department if symptoms worsen or do not improve

## 2019-10-02 NOTE — ED PROVIDER NOTES
Encounter Date: 10/2/2019    SCRIBE #1 NOTE: I, Emilie Ventura, am scribing for, and in the presence of,  Dr. Santizo. I have scribed the following portions of the note - Other sections scribed: HPI, ROS, PE.       History     Chief Complaint   Patient presents with    Vomiting      3 para 2 stated she is 7 weeks pregnant with complaints of vomiting and lower abdominal cramps for 2 days     Marly Mayfield is a 29 y.o. female that is 7 weeks pregnant. Presents to the ED complaining of lower abdominal pain that started 2 days ago and vomiting that started yesterday. Patient took zofran at home, but vomited medicine. These symptoms do not feel like the morning sickness in previous pregnancies. Had 2 vaginal deliveries and 2 living children. . Notes she has vaginal discharge, but denies vaginal bleeding and itching. Confirms she has been having unprotected sex, but is not concerned for STDs. Reports that she often has yeast infections and bacteria vaginosis. Last menstrual period started . Blood type is O+ and has never received rhogam shot.    The history is provided by the patient. No  was used.     Review of patient's allergies indicates:   Allergen Reactions    Sulfa (sulfonamide antibiotics) Anaphylaxis and Swelling     Past Medical History:   Diagnosis Date    Addiction to drug     THCA as a teenager, last smoked 8 years ago    Anxiety     Bipolar disorder     Bronchitis, chronic     Hallucination     Headache     Hx of psychiatric care     Cait     Psychiatric problem     Psychosis     Sleep difficulties     Substance abuse     Suicide attempt     Therapy      Past Surgical History:   Procedure Laterality Date    rectocele       Family History   Problem Relation Age of Onset    Hypertension Mother     Liver disease Father     Bipolar disorder Father     Schizophrenia Father     Alcohol abuse Father     Drug abuse Father     Anxiety disorder Paternal  Grandfather     Breast cancer Neg Hx     Colon cancer Neg Hx     Ovarian cancer Neg Hx      Social History     Tobacco Use    Smoking status: Former Smoker     Packs/day: 0.50     Years: 4.00     Pack years: 2.00     Types: Cigarettes    Smokeless tobacco: Never Used   Substance Use Topics    Alcohol use: Yes     Alcohol/week: 0.8 standard drinks     Types: 1 Standard drinks or equivalent per week     Comment: social    Drug use: Yes     Types: Marijuana     Comment: stopped 8 years ago at age 20     Review of Systems   Constitutional: Negative for fever.   HENT: Negative for sore throat.    Respiratory: Negative for shortness of breath.    Cardiovascular: Negative for chest pain.   Gastrointestinal: Positive for abdominal pain, nausea and vomiting. Negative for diarrhea.   Genitourinary: Positive for vaginal discharge. Negative for dysuria, pelvic pain, vaginal bleeding and vaginal pain.   Musculoskeletal: Negative for back pain.   Skin: Negative for rash.   Neurological: Negative for weakness.   Hematological: Does not bruise/bleed easily.   All other systems reviewed and are negative.      Physical Exam     Initial Vitals [10/02/19 0905]   BP Pulse Resp Temp SpO2   127/82 88 20 98 °F (36.7 °C) 100 %      MAP       --         Patient gave consent to have physical exam performed.    Physical Exam    Nursing note and vitals reviewed.  Constitutional: She appears well-developed and well-nourished.   HENT:   Head: Normocephalic and atraumatic.   Right Ear: External ear normal.   Left Ear: External ear normal.   Nose: Nose normal.   Mouth/Throat: Oropharynx is clear and moist.   Eyes: Conjunctivae and EOM are normal. Pupils are equal, round, and reactive to light.   Neck: Normal range of motion and phonation normal. Neck supple.   Cardiovascular: Normal rate, regular rhythm, normal heart sounds and intact distal pulses. Exam reveals no gallop and no friction rub.    No murmur heard.  Pulmonary/Chest: Effort  normal and breath sounds normal. No stridor. No respiratory distress. She has no wheezes. She has no rhonchi. She has no rales. She exhibits no tenderness.   Abdominal: Soft. Bowel sounds are normal. She exhibits no distension. There is tenderness in the suprapubic area. There is no rigidity, no rebound and no guarding.   Genitourinary: Pelvic exam was performed with patient supine. There is no rash, tenderness or lesion on the right labia. There is no rash, tenderness or lesion on the left labia. Cervix exhibits discharge and friability. Cervix exhibits no motion tenderness. Right adnexum displays no mass, no tenderness and no fullness. Left adnexum displays no mass and no tenderness. No erythema or bleeding in the vagina. Vaginal discharge found.   Genitourinary Comments: Pelvic exam performed yellow vaginal discharge appreciated. No cervical motion tenderness.  Cultures obtained and pending   Musculoskeletal: Normal range of motion. She exhibits no edema or tenderness.   Neurological: She is alert and oriented to person, place, and time. She has normal strength. No cranial nerve deficit or sensory deficit. GCS score is 15. GCS eye subscore is 4. GCS verbal subscore is 5. GCS motor subscore is 6.   Skin: Skin is warm and dry. Capillary refill takes less than 2 seconds. No rash noted.   Psychiatric: She has a normal mood and affect. Her behavior is normal.         ED Course   Procedures  Labs Reviewed   POCT URINE PREGNANCY - Abnormal; Notable for the following components:       Result Value    POC Preg Test, Ur Positive (*)     All other components within normal limits   POCT CMP - Abnormal; Notable for the following components:    POC Creatinine 0.5 (*)     All other components within normal limits   VAGINOSIS SCREEN BY DNA PROBE   C. TRACHOMATIS/N. GONORRHOEAE BY AMP DNA   HCG, QUANTITATIVE, PREGNANCY   POCT CBC   POCT URINALYSIS W/O SCOPE   POCT URINALYSIS, DIPSTICK OR TABLET REAGENT, AUTOMATED, WITH MICROSCOP    GROUP & RH   POCT CMP   POCT B-HCG (QUANT)   POCT B-HCG (QUANT)                  Imaging Results          US OB Less Than 14 Wks with Transvaginal (xpd) (Final result)  Result time 10/02/19 11:56:36   Procedure changed from US OB Less Than 14 Wks First Gestation     Final result by Rock Lyn MD (10/02/19 11:56:36)                 Impression:      Single living intrauterine fetus with a sonographic age of 7 weeks, 1 day.      Electronically signed by: Rock Lyn MD  Date:    10/02/2019  Time:    11:56             Narrative:    EXAMINATION:  US OB LESS THAN 14 WKS WITH TRANSVAGINAL (XPD)    CLINICAL HISTORY:  pelvic pain; Other specified pregnancy related conditions, unspecified trimester    TECHNIQUE:  Transabdominal sonography of the pelvis was performed, followed by transvaginal sonography to better evaluate the uterus and ovaries.    COMPARISON:  November 2, 2018    FINDINGS:  The uterus is normal in echotexture, and measures 9.5 x 4.6 x 6.7 cm.    Single living intrauterine fetus with a crown-rump length of 0.959 cm, which corresponds to a sonographic age of 7 weeks, 1 day.  Unremarkable gestational and yolk sacs.  The fetal heart rate is 139 beats per minute.    The cervix is unremarkable.    Trace free pelvic fluid.    The right ovary measures 3.4 x 2.4 x 2.4 cm, and demonstrates a corpus luteal cyst measuring 1.7 cm.  The left ovary measures 1.9 x 2.3 x 1.7 cm.    Color and spectral Doppler images demonstrate symmetric and expected bilateral ovarian vascularity.                                 Medical Decision Making:   Clinical Tests:   Lab Tests: Ordered and Reviewed  Radiological Study: Ordered and Reviewed  Chief complaint: Abd pain with N/V  Differential diagnosis: UTI, bacteria vaginosis, ectopic pregnancy, chlamydia, gonorrhea  Treatment in the ED, acetaminophen injection 10mg/ml, IV fluids, Zofran  Patient reports feeling better after treatment in the ER.    Ordered US, UA, CBC, CMP,  betaHCG, vaginosis screen, STD screen    12:33 PM   Patient reevaluated. Reports feeling better. Will PO challenge    Discussed treatment, prescriptions, labs, and imaging results.  Fill and take prescriptions as directed.  Return to the ED if symptoms worsen or do not resolve.   Answered questions and discussed discharge plan.    Patient feels better and is ready for discharge.  Follow up with PCP/specialist in 1 day.            Scribe Attestation:   Scribe #1: I performed the above scribed service and the documentation accurately describes the services I performed. I attest to the accuracy of the note.     I, Dr. Inna Santizo, personally performed the services described in this documentation. This document was produced by a scribe under my direction and in my presence. All medical record entries made by the scribe were at my direction and in my presence.  I have reviewed the chart and agree that the record reflects my personal performance and is accurate and complete. Inna Santizo DO.     10/02/2019 10:35 AM             Clinical Impression:     1. Less than 8 weeks gestation of pregnancy    2. Abdominal pain in pregnancy                                   Inna Santizo DO  10/02/19 1609

## 2019-10-03 LAB
BACTERIAL VAGINOSIS DNA: NEGATIVE
C TRACH DNA SPEC QL NAA+PROBE: NOT DETECTED
CANDIDA GLABRATA DNA: NEGATIVE
CANDIDA KRUSEI DNA: NEGATIVE
CANDIDA RRNA VAG QL PROBE: NEGATIVE
N GONORRHOEA DNA SPEC QL NAA+PROBE: NOT DETECTED
T VAGINALIS RRNA GENITAL QL PROBE: NEGATIVE

## 2019-12-02 PROBLEM — Z00.00 ANNUAL PHYSICAL EXAM: Status: RESOLVED | Noted: 2019-08-27 | Resolved: 2019-12-02

## 2020-05-15 NOTE — PROGRESS NOTES
This note was created by combination of typed  and M-Modal dictation.  Transcription errors may be present.  If there are any questions, please contact me.    Assessment / Plan:   Post partum depression  -not controlled with Zoloft.  I will try her with an alternative SSRI.  However we discussed that Zoloft seems to be the best studied in breast-feeding and the other ones are not recommended or not as well proven for safety during breastfeeding.  She will switch to formula feeding.  Change Zoloft to Lexapro.  Overlap for 3 days and then stop Zoloft.  I also gave her the contact information for Psychiatry she is interested in seeing a therapist.  -     Ambulatory referral/consult to Psychiatry; Future; Expected date: 05/25/2020  -     escitalopram oxalate (LEXAPRO) 10 MG tablet; Take 1 tablet (10 mg total) by mouth once daily.  Dispense: 30 tablet; Refill: 11    Chronic intractable headache, unspecified headache type  -has some features of migraine IE can last for several hours, throbbing headache, some photophobia.  But seems to be more global.  And I wonder if this is related to more tension headache.  Or pseudotumor?  I do not see any blurred disc margins.  She actually has an upcoming consult with neurology.  Has blurred vision as well.  Follow-up with Neurology and also consider seen optometry for visual acuity exam.  Blood pressure is better today on current regimen.  Avoid NSAIDs as they may worsen hypertension  She can use the Fioricet sparingly though I discussed with her that this could lead to analgesic rebound headache in the future.  Alternate with Tylenol.  In the future may consider trial of amitriptyline but for now focus on changing of Zoloft to Lexapro.    Medications Discontinued During This Encounter   Medication Reason    sertraline (ZOLOFT) 100 MG tablet Therapy completed       meds sent this encounter:  Medications Ordered This Encounter   Medications    escitalopram oxalate  (LEXAPRO) 10 MG tablet     Sig: Take 1 tablet (10 mg total) by mouth once daily.     Dispense:  30 tablet     Refill:  11       Follow Up: No follow-ups on file. follow up 2 weeks.      Subjective:     Chief Complaint   Patient presents with    Depression    Headache    Eye Problem       HPI  Marly VALLES is a 30 y.o. female, last appointment with this clinic was Visit date not found.    Patient's last menstrual period was 08/12/2019 (exact date).    New to me  5/1/20 ER visit.  Headache and lightheadedness with elevated BP.  Previous diagnosis of preeclampsia.  Home readings reportedly systolic 160s.  And then up to 200s.  History of migraines.    On presentation her BP was 206/111.  Urine proteinuria.  Admitted for uncontrolled hypertension/preeclampsia in the postpartum period.  Discharged on Procardia XL 60  Seen by Cardiology subsequently 5/4.  Increase Procardia to b.i.d..  Stop NSAIDs.  Stop vaping.  Plan was echo    Subsequent ER visit 05/06 for headache.  Discharged on Fioricet.    Migraine history with history of propranolol    Comes in today with multiple issues  Mood.  Postpartum depression.  Does not feel like the Zoloft is working.  Her obstetrician has been increasing the dose.  Zoloft 100 and just increased to 150.  She has not started the 150 yet.  But overall feels like her mood is not great.  Feels like the Zoloft is making her feel disconnected.  And possibly worsening thoughts of self-harm.  She has no serious thoughts of suicide as she definitely wants to be there for her child.  But overall feels like her mood is not great.  Feels depressed.  And some anxiety.    Headaches.  Global headaches.  Behind both eyes.  Recurrent and frequent.  Sometimes in the back as well.  Sometimes some photophobia.  Recalls a history of migraines but not sure if she ever had formal diagnosis or met formal criteria.  But these headaches may last for hours.  Fioricet does help to dull the pain.  She was told to avoid  NSAIDs.  So she has been taking a lot of Tylenol as well.    Recent birth of her baby girl who is doing okay.  She is currently breast-feeding but if she needs to stop because of medication she can do so she states.    Her obstetrician recommended she see a therapist.  She had previously seen the psychiatric nurse practitioner in the past.  Who had tried her on Zoloft.  She only took it for week because it made her nauseous.  And then she stop.    Blood pressures are better.  She is wary of the current regimen but the rationale for these medications were discussed with the patient IE I think that these are compatible with breastfeeding.    She notes blurred vision.  Has been ongoing now ever since postpartum.  Decreased visual acuity.    Patient Care Team:  Ely Gupta MD as PCP - General (Family Medicine)    Patient Active Problem List    Diagnosis Date Noted    Female fertility problems 08/27/2019    Ovarian dysfunction, unspecified 08/27/2019    Endometrial mass 11/13/2018    Adult premenstrual acne 11/13/2018    Insomnia due to psychological stress 10/26/2018    Bipolar depression 10/12/2018    Migraine without aura and without status migrainosus, not intractable 10/12/2018    Upper abdominal pain     Tobacco smoking complicating pregnancy 10/28/2013     Pt advised d/c.         PAST MEDICAL HISTORY:  Past Medical History:   Diagnosis Date    Addiction to drug     THCA as a teenager, last smoked 8 years ago    Anxiety     Bipolar disorder     Bronchitis, chronic     Hallucination     Headache     Hx of psychiatric care     Cait     Psychiatric problem     Psychosis     Sleep difficulties     Substance abuse     Suicide attempt     Therapy        PAST SURGICAL HISTORY:  Past Surgical History:   Procedure Laterality Date    rectocele         SOCIAL HISTORY:  Social History     Socioeconomic History    Marital status:      Spouse name: Ignacio    Number of children: 2    Years  of education: Not on file    Highest education level: Not on file   Occupational History    Occupation:      Comment: [employer]   Social Needs    Financial resource strain: Not on file    Food insecurity:     Worry: Not on file     Inability: Not on file    Transportation needs:     Medical: Not on file     Non-medical: Not on file   Tobacco Use    Smoking status: Former Smoker     Packs/day: 0.50     Years: 4.00     Pack years: 2.00     Types: Cigarettes    Smokeless tobacco: Never Used   Substance and Sexual Activity    Alcohol use: Yes     Alcohol/week: 0.8 standard drinks     Types: 1 Standard drinks or equivalent per week     Comment: social    Drug use: Yes     Types: Marijuana     Comment: stopped 8 years ago at age 20    Sexual activity: Yes     Partners: Male, Female     Birth control/protection: Injection     Comment: presently male only   Lifestyle    Physical activity:     Days per week: Not on file     Minutes per session: Not on file    Stress: Not on file   Relationships    Social connections:     Talks on phone: Not on file     Gets together: Not on file     Attends Mandae[employer] service: Not on file     Active member of club or organization: Not on file     Attends meetings of clubs or organizations: Not on file     Relationship status: Not on file   Other Topics Concern    Patient feels they ought to cut down on drinking/drug use No    Patient annoyed by others criticizing their drinking/drug use No    Patient has felt bad or guilty about drinking/drug use No    Patient has had a drink/used drugs as an eye opener in the AM No   Social History Narrative    Lives with  and two kids.    Mother passed away 08/29/2018.    Father is in halfway for murdering her stepsister.        ALLERGIES AND MEDICATIONS: updated and reviewed.  Review of patient's allergies indicates:   Allergen Reactions    Sulfa (sulfonamide antibiotics) Anaphylaxis and Swelling     Current Outpatient  "Medications   Medication Sig Dispense Refill    acetaminophen (TYLENOL) 500 MG tablet Take 2 tablets (1,000 mg total) by mouth every 6 (six) hours as needed for Pain. 30 tablet 0    butalbital-acetaminophen-caffeine -40 mg (FIORICET, ESGIC) -40 mg per tablet Take 1 tablet by mouth every 4 (four) hours as needed for Pain.      labetaloL (NORMODYNE) 100 MG tablet Take 100 mg by mouth 2 (two) times daily.      NIFEdipine (ADALAT CC) 30 MG TbSR Take 30 mg by mouth 2 (two) times a day.      sertraline (ZOLOFT) 100 MG tablet Take 100 mg by mouth once daily.      doxylamine succinate 25 mg tablet Take 1 tablet (25 mg total) by mouth nightly as needed (Take nightly to prevent nausea). (Patient not taking: Reported on 5/18/2020) 10 tablet 0    ondansetron (ZOFRAN) 4 MG tablet Take 4 mg by mouth 2 (two) times daily.      prenatal 21-iron fu-folic acid (PRENATAL COMPLETE) 14 mg iron- 400 mcg Tab Take 1 tablet by mouth once daily. (Patient not taking: Reported on 5/18/2020) 30 tablet 0    prenatal vit calc,iron,folic (PRENATAL VITAMIN ORAL) Take 1 tablet by mouth once daily.      promethazine (PHENERGAN) 25 MG suppository Place 1 suppository (25 mg total) rectally every 6 (six) hours as needed (Use if  nausea not controlled with oral medication). (Patient not taking: Reported on 5/18/2020) 10 suppository 0     No current facility-administered medications for this visit.        Review of Systems   All other systems reviewed and are negative.      Objective:   Physical Exam   Vitals:    05/18/20 1553   BP: 128/88   BP Location: Left arm   Patient Position: Sitting   BP Method: Medium (Manual)   Pulse: 90   Temp: 97.6 °F (36.4 °C)   TempSrc: Oral   SpO2: 97%   Weight: 94.3 kg (208 lb 0.1 oz)   Height: 5' 4" (1.626 m)    Body mass index is 35.7 kg/m².  Weight: 94.3 kg (208 lb 0.1 oz)   Height: 5' 4" (162.6 cm)     Physical Exam   Constitutional: She is oriented to person, place, and time. She appears " well-developed and well-nourished. She appears distressed.   Tearful   Eyes: EOM are normal.   Funduscopic exam grossly normal with disc margins sharp.  No hemorrhaging.   Cardiovascular: Normal rate, regular rhythm and normal heart sounds.   No murmur heard.  Pulmonary/Chest: Effort normal and breath sounds normal.   Musculoskeletal: Normal range of motion. She exhibits no edema.   Neurological: She is alert and oriented to person, place, and time. Coordination normal.   Skin: Skin is warm and dry.   Psychiatric: She has a normal mood and affect. Her behavior is normal. Thought content normal.   Tearful.  No internal stimuli.  No psychomotor retardation or agitation.  Normal hygiene.  Appropriate response to questions.

## 2020-05-18 ENCOUNTER — OFFICE VISIT (OUTPATIENT)
Dept: FAMILY MEDICINE | Facility: CLINIC | Age: 30
End: 2020-05-18
Payer: COMMERCIAL

## 2020-05-18 VITALS
HEART RATE: 90 BPM | TEMPERATURE: 98 F | DIASTOLIC BLOOD PRESSURE: 88 MMHG | BODY MASS INDEX: 35.51 KG/M2 | SYSTOLIC BLOOD PRESSURE: 128 MMHG | HEIGHT: 64 IN | WEIGHT: 208 LBS | OXYGEN SATURATION: 97 %

## 2020-05-18 DIAGNOSIS — R51.9 CHRONIC INTRACTABLE HEADACHE, UNSPECIFIED HEADACHE TYPE: ICD-10-CM

## 2020-05-18 DIAGNOSIS — G89.29 CHRONIC INTRACTABLE HEADACHE, UNSPECIFIED HEADACHE TYPE: ICD-10-CM

## 2020-05-18 PROCEDURE — 99999 PR PBB SHADOW E&M-EST. PATIENT-LVL IV: ICD-10-PCS | Mod: PBBFAC,,, | Performed by: INTERNAL MEDICINE

## 2020-05-18 PROCEDURE — 3008F BODY MASS INDEX DOCD: CPT | Mod: CPTII,S$GLB,, | Performed by: INTERNAL MEDICINE

## 2020-05-18 PROCEDURE — 99999 PR PBB SHADOW E&M-EST. PATIENT-LVL IV: CPT | Mod: PBBFAC,,, | Performed by: INTERNAL MEDICINE

## 2020-05-18 PROCEDURE — 99214 PR OFFICE/OUTPT VISIT, EST, LEVL IV, 30-39 MIN: ICD-10-PCS | Mod: S$GLB,,, | Performed by: INTERNAL MEDICINE

## 2020-05-18 PROCEDURE — 99214 OFFICE O/P EST MOD 30 MIN: CPT | Mod: S$GLB,,, | Performed by: INTERNAL MEDICINE

## 2020-05-18 PROCEDURE — 3008F PR BODY MASS INDEX (BMI) DOCUMENTED: ICD-10-PCS | Mod: CPTII,S$GLB,, | Performed by: INTERNAL MEDICINE

## 2020-05-18 RX ORDER — ESCITALOPRAM OXALATE 10 MG/1
10 TABLET ORAL DAILY
Qty: 30 TABLET | Refills: 11 | Status: SHIPPED | OUTPATIENT
Start: 2020-05-18 | End: 2020-05-20 | Stop reason: SINTOL

## 2020-05-18 RX ORDER — BUTALBITAL, ACETAMINOPHEN AND CAFFEINE 50; 325; 40 MG/1; MG/1; MG/1
1 TABLET ORAL EVERY 4 HOURS PRN
COMMUNITY
End: 2021-04-09 | Stop reason: ALTCHOICE

## 2020-05-18 RX ORDER — NIFEDIPINE 30 MG/1
30 TABLET, FILM COATED, EXTENDED RELEASE ORAL 2 TIMES DAILY
COMMUNITY
End: 2021-04-09 | Stop reason: ALTCHOICE

## 2020-05-18 RX ORDER — SERTRALINE HYDROCHLORIDE 100 MG/1
100 TABLET, FILM COATED ORAL DAILY
COMMUNITY
End: 2020-05-18 | Stop reason: ALTCHOICE

## 2020-05-18 RX ORDER — LABETALOL 100 MG/1
100 TABLET, FILM COATED ORAL 2 TIMES DAILY
COMMUNITY
End: 2021-04-09 | Stop reason: ALTCHOICE

## 2020-05-18 NOTE — PATIENT INSTRUCTIONS
The [facility] requires the patients the call and make their own appointments.  Please see the below phone numbers:    [facility]: [phone]  Providers:   Vida Velasquez LCSW    [facility] [place]:  Adults - [phone]  Pediatrics - [phone]      Let's try lexapro. 10 mg  For the next 3 days overlap with zoloft 50 mg. Then stop the zoloft.    Follow up 2 weeks.

## 2020-05-19 ENCOUNTER — TELEPHONE (OUTPATIENT)
Dept: FAMILY MEDICINE | Facility: CLINIC | Age: 30
End: 2020-05-19

## 2020-05-19 NOTE — TELEPHONE ENCOUNTER
Patient called stating she has been feeling weak,dizzey,diarrhea x 2. Patient states she thinks it's the Lexapro. Patient states she only took Lexapro this morning at 9 am.I spoke to Dr. Oconnor he advised to have patient continue to take Zoloft 50 mg along with Lexapro for 2 days. Advised patient stop the Zoloft after the 2 days & continue the Lexapro. Patient states she wants stop the medication. Advised patient Dr. Oconnor has given his recommendation regarding medication.patient verbalized an understanding. MÓNICA

## 2020-05-19 NOTE — TELEPHONE ENCOUNTER
Patient spoke with nurse triage, noting dizziness, lightheadedness, patient is concerned that this is side effect of the medication.  She has been taking Zoloft 100.  She cut down the Zoloft 50 but has yet start the Lexapro.  She wants to stop the Zoloft.  I am concerned that this represents a side effect of reduced dose.  She wants to stop the Zoloft and let it get out of her system completely before starting the Lexapro.  I have recommended not to stop SSRI therapy completely.  I would continue to overlap the Zoloft with Lexapro for 2 more days, Zoloft 50 on Lexapro 10, and then stop the Zoloft and stay on Lexapro.

## 2020-05-19 NOTE — TELEPHONE ENCOUNTER
----- Message from Abigail Razo sent at 5/19/2020 10:59 AM CDT -----  Contact: Patient   Type:  Needs Medical Advice/Symptom-based Call    Who Called:  Patient     Symptoms (please be specific): Dizziness, diarrhea from escitalopram oxalate (LEXAPRO) 10 MG tablet    How long has patient had these symptoms:      Would the patient rather a call back or a response via [facility]? Call back     Best Call Back Number: [phone]

## 2020-05-20 ENCOUNTER — TELEPHONE (OUTPATIENT)
Dept: FAMILY MEDICINE | Facility: CLINIC | Age: 30
End: 2020-05-20

## 2020-05-20 NOTE — TELEPHONE ENCOUNTER
Patient stated that she has been taking zoloft 50mg for 3 weeks now. Complains of dizziness, off balance. Has a  at home and worried about continue taking zoloft. Dr. Oconnor recommends patient breaking the tablets in half taking 25mg for 1 week. Patient is not happy with taking that. Dr. Oconnor offered her to try Wellbutrin but decline and also lexapro. I inform patient to try the 25mg tablet first for 2 days, give the office a call back on Friday to let us know how are her symptoms. Verbally understands.

## 2020-05-20 NOTE — TELEPHONE ENCOUNTER
Staff spoke with pt today 5/20, pt notes continued nausea and thinks it's the lexapro. Does not want to revert back to the zoloft.  She would be willing to try wellbutrin but not in addition to the zoloft.    Offered wellbutrin monotherapy. She wants to stop all meds first.    She can wean the zoloft to 50 x 1 week, and then to 25 for one week, then stop.    Consider wellbutrin after that.

## 2020-05-22 NOTE — PROGRESS NOTES
This note was created by combination of typed  and M-Modal dictation.  Transcription errors may be present.  If there are any questions, please contact me.    Assessment / Plan:   Post partum depression  -thinks that the Zoloft cause thoughts suicidal ideation.  Though she is not sure if it is due to the medication or if this is due to her postpartum depression.  Lexapro she thinks gave her nausea.  Recalls a history of bupropion smoking cessation and caused her jitteriness.  She is wary of medications in general.  However it sounds like her current situation is not tolerable.  Trial of fluoxetine low-dose 10 mg.  Try this monotherapy 1st.  Increase it if tolerated.  If she finds significant anxiety, I have also sent in BuSpar.  She can take that at night.  And up to t.i.d. if needed if no side effects.  -     FLUoxetine 10 MG capsule; Take 1 capsule (10 mg total) by mouth once daily.  Dispense: 30 capsule; Refill: 11  -     busPIRone (BUSPAR) 15 MG tablet; Take 1 tablet (15 mg total) by mouth every evening.  Dispense: 30 tablet; Refill: 11          Medications Discontinued During This Encounter   Medication Reason    furosemide (LASIX) 20 MG tablet Therapy completed    doxylamine succinate 25 mg tablet Therapy completed    sertraline (ZOLOFT) 100 MG tablet Side effects       meds sent this encounter:  Medications Ordered This Encounter   Medications    busPIRone (BUSPAR) 15 MG tablet     Sig: Take 1 tablet (15 mg total) by mouth every evening.     Dispense:  30 tablet     Refill:  11     Take the prozac first    FLUoxetine 10 MG capsule     Sig: Take 1 capsule (10 mg total) by mouth once daily.     Dispense:  30 capsule     Refill:  11       Follow Up: No follow-ups on file.  Follow-up 2 weeks      Subjective:     Chief Complaint   Patient presents with    Anxiety       HPI  Marly VALLES is a 30 y.o. female, last appointment with this clinic was 5/18/2020.    Patient's last menstrual period was  08/12/2019 (exact date).    Last visit for post partum depression. zoloft ineffective. Plan was to transition to lexapro.  Subsequent call c/o nausea she attributed to lexapro.  Plan was to stop the zoloft, then try bupropion  Pre-eclampsia, post partum, nifedipine and labetalol.  Headache. Pt to be seeing neurology  Seeing cardiology already.    Neuro this AM - recommended MRI brain and started on new rx topamax.  Has not picked it up yet.     She thinks that the Zoloft gave her thoughts of self-harm.  Although she is not sure if that is due to her postpartum depression or the Zoloft or both.  She has been off of Zoloft completely now for about 5 days.  Feels no thoughts of self-harm at this time.    The Lexapro she thinks made her feel nauseous weak and lightheaded.  That has not recurred since she has not restarted the Lexapro low-dose.    She has tried to contact Psychiatry and psychiatry does not have availability for quite some time, at least a month.    We talked at length about medications.  I would try her on a different SSRI.  History of bupropion which she took in the past for smoking cessation and made her jittery.  Though she has a relatively takes SSRIs and combination Wellbutrin.  She is most anxious about her anxiety.  She is concerned that the medications may worsen her anxiety.  I will try her on Prozac.  And I will give her prescription for BuSpar.  But she would like to try 1 medication at a time and I think that that would be reasonable.  Discussed she should continue to formula feed and not breastfeed with these medications.    Patient Care Team:  Ely Gupta MD as PCP - General (Family Medicine)    Patient Active Problem List    Diagnosis Date Noted    Female fertility problems 08/27/2019    Ovarian dysfunction, unspecified 08/27/2019    Endometrial mass 11/13/2018    Adult premenstrual acne 11/13/2018    Insomnia due to psychological stress 10/26/2018    Bipolar depression  10/12/2018    Migraine without aura and without status migrainosus, not intractable 10/12/2018    Upper abdominal pain     Tobacco smoking complicating pregnancy 10/28/2013     Pt advised d/c.         PAST MEDICAL HISTORY:  Past Medical History:   Diagnosis Date    Addiction to drug     THCA as a teenager, last smoked 8 years ago    Anxiety     Bipolar disorder     Bronchitis, chronic     Hallucination     Headache     Hx of psychiatric care     Cait     Psychiatric problem     Psychosis     Sleep difficulties     Substance abuse     Suicide attempt     Therapy        PAST SURGICAL HISTORY:  Past Surgical History:   Procedure Laterality Date    rectocele         SOCIAL HISTORY:  Social History     Socioeconomic History    Marital status:      Spouse name: Ignacio    Number of children: 2    Years of education: Not on file    Highest education level: Not on file   Occupational History    Occupation: [employer]     Comment: [employer]   Social Needs    Financial resource strain: Not on file    Food insecurity:     Worry: Not on file     Inability: Not on file    Transportation needs:     Medical: Not on file     Non-medical: Not on file   Tobacco Use    Smoking status: Former Smoker     Packs/day: 0.50     Years: 4.00     Pack years: 2.00     Types: Cigarettes    Smokeless tobacco: Never Used   Substance and Sexual Activity    Alcohol use: Yes     Alcohol/week: 0.8 standard drinks     Types: 1 Standard drinks or equivalent per week     Comment: social    Drug use: Yes     Types: Marijuana     Comment: stopped 8 years ago at age 20    Sexual activity: Yes     Partners: Male, Female     Birth control/protection: Injection     Comment: presently male only   Lifestyle    Physical activity:     Days per week: Not on file     Minutes per session: Not on file    Stress: Not on file   Relationships    Social connections:     Talks on phone: Not on file     Gets together: Not on file      Attends [employer] service: Not on file     Active member of club or organization: Not on file     Attends meetings of clubs or organizations: Not on file     Relationship status: Not on file   Other Topics Concern    Patient feels they ought to cut down on drinking/drug use No    Patient annoyed by others criticizing their drinking/drug use No    Patient has felt bad or guilty about drinking/drug use No    Patient has had a drink/used drugs as an eye opener in the AM No   Social History Narrative    Lives with  and two kids.    Mother passed away 08/29/2018.    Father is in prison for murdering her stepsister.        ALLERGIES AND MEDICATIONS: updated and reviewed.  Review of patient's allergies indicates:   Allergen Reactions    Sulfa (sulfonamide antibiotics) Anaphylaxis and Swelling    Megestrol Hives and Other (See Comments)     pimples     Current Outpatient Medications   Medication Sig Dispense Refill    acetaminophen (TYLENOL) 500 MG tablet Take 2 tablets (1,000 mg total) by mouth every 6 (six) hours as needed for Pain. 30 tablet 0    butalbital-acetaminophen-caffeine -40 mg (FIORICET, ESGIC) -40 mg per tablet Take 1 tablet by mouth every 4 (four) hours as needed for Pain.      diazePAM (VALIUM) 2 MG tablet TK 1 T PO Q 8 H PRA       mg capsule TK 1 C PO BID      labetaloL (NORMODYNE) 100 MG tablet Take 100 mg by mouth 2 (two) times daily.      NIFEdipine (ADALAT CC) 30 MG TbSR Take 30 mg by mouth 2 (two) times a day.      NIFEdipine (PROCARDIA-XL) 60 MG (OSM) 24 hr tablet TK 1 T PO BID      doxylamine succinate 25 mg tablet Take 1 tablet (25 mg total) by mouth nightly as needed (Take nightly to prevent nausea). (Patient not taking: Reported on 5/18/2020) 10 tablet 0    furosemide (LASIX) 20 MG tablet TK 1 T PO  PO BID      ondansetron (ZOFRAN) 4 MG tablet Take 4 mg by mouth 2 (two) times daily.      prenatal 21-iron fu-folic acid (PRENATAL COMPLETE) 14 mg iron- 400  "mcg Tab Take 1 tablet by mouth once daily. (Patient not taking: Reported on 5/18/2020) 30 tablet 0    prenatal vit calc,iron,folic (PRENATAL VITAMIN ORAL) Take 1 tablet by mouth once daily.      promethazine (PHENERGAN) 25 MG suppository Place 1 suppository (25 mg total) rectally every 6 (six) hours as needed (Use if  nausea not controlled with oral medication). (Patient not taking: Reported on 5/18/2020) 10 suppository 0    sertraline (ZOLOFT) 100 MG tablet Take 100 mg by mouth.       No current facility-administered medications for this visit.        Review of Systems   All other systems reviewed and are negative.      Objective:   Physical Exam   Vitals:    05/25/20 1036   BP: 116/76   Pulse: 94   Temp: 97.1 °F (36.2 °C)   TempSrc: Oral   SpO2: 97%   Weight: 92.8 kg (204 lb 9.4 oz)   Height: 5' 4" (1.626 m)    Body mass index is 35.12 kg/m².  Weight: 92.8 kg (204 lb 9.4 oz)   Height: 5' 4" (162.6 cm)     Physical Exam   Constitutional: She is oriented to person, place, and time. She appears well-developed and well-nourished. No distress.   Eyes: EOM are normal.   Cardiovascular: Normal rate, regular rhythm and normal heart sounds.   No murmur heard.  Pulmonary/Chest: Effort normal and breath sounds normal.   Musculoskeletal: Normal range of motion.   Neurological: She is alert and oriented to person, place, and time. Coordination normal.   Skin: Skin is warm and dry.   Psychiatric: She has a normal mood and affect. Her behavior is normal. Thought content normal.   Occasionally tearful during our discussion.  Normal hygiene.  Normal eye contact.  No internal stimuli.  No psychomotor retardation or agitation.     "

## 2020-05-25 ENCOUNTER — OFFICE VISIT (OUTPATIENT)
Dept: FAMILY MEDICINE | Facility: CLINIC | Age: 30
End: 2020-05-25
Payer: COMMERCIAL

## 2020-05-25 VITALS
BODY MASS INDEX: 34.92 KG/M2 | DIASTOLIC BLOOD PRESSURE: 76 MMHG | OXYGEN SATURATION: 97 % | SYSTOLIC BLOOD PRESSURE: 116 MMHG | HEIGHT: 64 IN | TEMPERATURE: 97 F | WEIGHT: 204.56 LBS | HEART RATE: 94 BPM

## 2020-05-25 PROCEDURE — 99999 PR PBB SHADOW E&M-EST. PATIENT-LVL III: ICD-10-PCS | Mod: PBBFAC,,, | Performed by: INTERNAL MEDICINE

## 2020-05-25 PROCEDURE — 99214 OFFICE O/P EST MOD 30 MIN: CPT | Mod: S$GLB,,, | Performed by: INTERNAL MEDICINE

## 2020-05-25 PROCEDURE — 99999 PR PBB SHADOW E&M-EST. PATIENT-LVL III: CPT | Mod: PBBFAC,,, | Performed by: INTERNAL MEDICINE

## 2020-05-25 PROCEDURE — 3008F PR BODY MASS INDEX (BMI) DOCUMENTED: ICD-10-PCS | Mod: CPTII,S$GLB,, | Performed by: INTERNAL MEDICINE

## 2020-05-25 PROCEDURE — 3008F BODY MASS INDEX DOCD: CPT | Mod: CPTII,S$GLB,, | Performed by: INTERNAL MEDICINE

## 2020-05-25 PROCEDURE — 99214 PR OFFICE/OUTPT VISIT, EST, LEVL IV, 30-39 MIN: ICD-10-PCS | Mod: S$GLB,,, | Performed by: INTERNAL MEDICINE

## 2020-05-25 RX ORDER — FUROSEMIDE 20 MG/1
TABLET ORAL
COMMUNITY
Start: 2020-05-01 | End: 2020-05-25 | Stop reason: ALTCHOICE

## 2020-05-25 RX ORDER — BUSPIRONE HYDROCHLORIDE 15 MG/1
15 TABLET ORAL NIGHTLY
Qty: 30 TABLET | Refills: 11 | Status: SHIPPED | OUTPATIENT
Start: 2020-05-25 | End: 2021-04-09 | Stop reason: ALTCHOICE

## 2020-05-25 RX ORDER — NIFEDIPINE 60 MG/1
TABLET, EXTENDED RELEASE ORAL
COMMUNITY
Start: 2020-05-04 | End: 2021-04-09 | Stop reason: ALTCHOICE

## 2020-05-25 RX ORDER — FLUOXETINE 10 MG/1
10 CAPSULE ORAL DAILY
Qty: 30 CAPSULE | Refills: 11 | Status: SHIPPED | OUTPATIENT
Start: 2020-05-25 | End: 2020-05-26

## 2020-05-25 RX ORDER — DOCUSATE SODIUM 100 MG/1
CAPSULE, LIQUID FILLED ORAL
COMMUNITY
Start: 2020-04-28 | End: 2021-04-09 | Stop reason: ALTCHOICE

## 2020-05-25 RX ORDER — DIAZEPAM 2 MG/1
TABLET ORAL
COMMUNITY
Start: 2020-05-20 | End: 2023-05-25

## 2020-05-25 RX ORDER — SERTRALINE HYDROCHLORIDE 100 MG/1
100 TABLET, FILM COATED ORAL
COMMUNITY
Start: 2020-04-29 | End: 2020-05-25 | Stop reason: SINTOL

## 2020-05-26 ENCOUNTER — PATIENT MESSAGE (OUTPATIENT)
Dept: FAMILY MEDICINE | Facility: CLINIC | Age: 30
End: 2020-05-26

## 2020-05-26 RX ORDER — FLUOXETINE 10 MG/1
10 CAPSULE ORAL DAILY
Qty: 30 CAPSULE | Refills: 11 | Status: CANCELLED | OUTPATIENT
Start: 2020-05-26 | End: 2021-05-26

## 2020-05-26 RX ORDER — FLUOXETINE 10 MG/1
10 TABLET ORAL DAILY
Qty: 30 TABLET | Refills: 11 | Status: SHIPPED | OUTPATIENT
Start: 2020-05-26 | End: 2021-04-09 | Stop reason: ALTCHOICE

## 2020-05-26 NOTE — TELEPHONE ENCOUNTER
----- Message from Lisset Kvng sent at 5/26/2020  9:36 AM CDT -----  Contact: Self   Type: RX Refill Request    Who Called: Self     Have you contacted your pharmacy: yes     Refill or New Rx: refill     RX Name and Strength: FLUoxetine 10 MG capsule    Is this a 30 day or 90 day RX: 30 day     Preferred Pharmacy with phone number: [id number] - [place] - [address] [facility] [phone] (Phone)  [phone] (Fax)    Local or Mail Order: local     Would the patient rather a call back or a response via [facility]? Call back     Best Call Back Number: [phone]    Additional Information: pt. Stated she doesn't want capsule medications because its hard to swallow.

## 2020-09-18 NOTE — TELEPHONE ENCOUNTER
----- Message from Lilliana Tamayo sent at 10/12/2018 12:09 PM CDT -----  Contact: Self/ [phone]  Pt calling to notify office her medication prescribed from Dr. Gupta for [propranolol (INNOPRAN XL) 80 mg 24 hr capsule] is not covered by insurance. Please call to advise. Thank you.  .  [facility] [address] [place] [address] AT [address]  [place] [address]  Phone: [phone] Fax: [phone]       Propranolol Counseling:  I discussed with the patient the risks of propranolol including but not limited to low heart rate, low blood pressure, low blood sugar, restlessness and increased cold sensitivity. They should call the office if they experience any of these side effects.

## 2021-01-28 ENCOUNTER — HOSPITAL ENCOUNTER (EMERGENCY)
Facility: HOSPITAL | Age: 31
Discharge: HOME | End: 2021-01-28
Attending: EMERGENCY MEDICINE
Payer: COMMERCIAL

## 2021-01-28 VITALS
RESPIRATION RATE: 16 BRPM | WEIGHT: 217 LBS | TEMPERATURE: 98 F | SYSTOLIC BLOOD PRESSURE: 117 MMHG | HEIGHT: 64 IN | HEART RATE: 70 BPM | DIASTOLIC BLOOD PRESSURE: 65 MMHG | OXYGEN SATURATION: 100 % | BODY MASS INDEX: 37.05 KG/M2

## 2021-01-28 DIAGNOSIS — S99.919A ANKLE INJURY: ICD-10-CM

## 2021-01-28 DIAGNOSIS — S93.401A SPRAIN OF RIGHT ANKLE, UNSPECIFIED LIGAMENT, INITIAL ENCOUNTER: Primary | ICD-10-CM

## 2021-01-28 LAB
B-HCG UR QL: NEGATIVE
CTP QC/QA: YES

## 2021-01-28 PROCEDURE — 25000003 PHARM REV CODE 250: Mod: ER | Performed by: PHYSICIAN ASSISTANT

## 2021-01-28 PROCEDURE — 99284 EMERGENCY DEPT VISIT MOD MDM: CPT | Mod: 25,ER

## 2021-01-28 PROCEDURE — 81025 URINE PREGNANCY TEST: CPT | Mod: ER | Performed by: EMERGENCY MEDICINE

## 2021-01-28 RX ORDER — IBUPROFEN 600 MG/1
600 TABLET ORAL EVERY 6 HOURS PRN
Qty: 20 TABLET | Refills: 0 | Status: SHIPPED | OUTPATIENT
Start: 2021-01-28 | End: 2021-02-02

## 2021-01-28 RX ORDER — IBUPROFEN 600 MG/1
600 TABLET ORAL
Status: COMPLETED | OUTPATIENT
Start: 2021-01-28 | End: 2021-01-28

## 2021-01-28 RX ORDER — ACETAMINOPHEN 500 MG
500 TABLET ORAL EVERY 4 HOURS PRN
Qty: 20 TABLET | Refills: 0 | Status: SHIPPED | OUTPATIENT
Start: 2021-01-28 | End: 2021-02-02

## 2021-01-28 RX ADMIN — IBUPROFEN 600 MG: 600 TABLET ORAL at 11:01

## 2021-02-27 ENCOUNTER — NURSE TRIAGE (OUTPATIENT)
Dept: ADMINISTRATIVE | Facility: CLINIC | Age: 31
End: 2021-02-27

## 2021-02-27 RX ORDER — DROSPIRENONE AND ETHINYL ESTRADIOL 0.02-3(28)
1 KIT ORAL DAILY
Qty: 30 TABLET | Refills: 0 | Status: SHIPPED | OUTPATIENT
Start: 2021-02-27 | End: 2021-04-09 | Stop reason: SDUPTHER

## 2021-03-14 RX ORDER — DROSPIRENONE AND ETHINYL ESTRADIOL 0.02-3(28)
1 KIT ORAL DAILY
Qty: 30 TABLET | Refills: 0 | Status: CANCELLED | OUTPATIENT
Start: 2021-03-14 | End: 2022-03-14

## 2021-03-15 ENCOUNTER — PATIENT MESSAGE (OUTPATIENT)
Dept: INTERNAL MEDICINE | Facility: CLINIC | Age: 31
End: 2021-03-15

## 2021-03-15 DIAGNOSIS — Z30.09 BIRTH CONTROL COUNSELING: Primary | ICD-10-CM

## 2021-03-15 RX ORDER — DROSPIRENONE AND ETHINYL ESTRADIOL 0.02-3(28)
1 KIT ORAL DAILY
Qty: 30 TABLET | Refills: 0 | Status: SHIPPED | OUTPATIENT
Start: 2021-03-15 | End: 2021-04-09 | Stop reason: SDUPTHER

## 2021-04-09 ENCOUNTER — PATIENT MESSAGE (OUTPATIENT)
Dept: FAMILY MEDICINE | Facility: CLINIC | Age: 31
End: 2021-04-09

## 2021-04-09 DIAGNOSIS — Z30.09 BIRTH CONTROL COUNSELING: ICD-10-CM

## 2021-04-09 RX ORDER — DROSPIRENONE AND ETHINYL ESTRADIOL 0.02-3(28)
1 KIT ORAL DAILY
Qty: 30 TABLET | Refills: 0 | Status: SHIPPED | OUTPATIENT
Start: 2021-04-09 | End: 2023-05-25

## 2021-04-15 ENCOUNTER — PATIENT MESSAGE (OUTPATIENT)
Dept: RESEARCH | Facility: HOSPITAL | Age: 31
End: 2021-04-15

## 2021-05-02 ENCOUNTER — HOSPITAL ENCOUNTER (EMERGENCY)
Facility: HOSPITAL | Age: 31
Discharge: HOME | End: 2021-05-02
Attending: EMERGENCY MEDICINE
Payer: MEDICAID

## 2021-05-02 VITALS
WEIGHT: 206 LBS | OXYGEN SATURATION: 99 % | DIASTOLIC BLOOD PRESSURE: 63 MMHG | HEART RATE: 76 BPM | TEMPERATURE: 98 F | BODY MASS INDEX: 35.36 KG/M2 | RESPIRATION RATE: 18 BRPM | SYSTOLIC BLOOD PRESSURE: 119 MMHG

## 2021-05-02 DIAGNOSIS — S61.012A THUMB LACERATION, LEFT, INITIAL ENCOUNTER: Primary | ICD-10-CM

## 2021-05-02 LAB
B-HCG UR QL: NEGATIVE
CTP QC/QA: YES

## 2021-05-02 PROCEDURE — 99284 EMERGENCY DEPT VISIT MOD MDM: CPT | Mod: 25,ER

## 2021-05-02 PROCEDURE — 96365 THER/PROPH/DIAG IV INF INIT: CPT | Mod: ER,59

## 2021-05-02 PROCEDURE — 12002 RPR S/N/AX/GEN/TRNK2.6-7.5CM: CPT | Mod: ER

## 2021-05-02 PROCEDURE — 81025 URINE PREGNANCY TEST: CPT | Mod: ER | Performed by: EMERGENCY MEDICINE

## 2021-05-02 PROCEDURE — 12042 INTMD RPR N-HF/GENIT2.6-7.5: CPT

## 2021-05-02 PROCEDURE — 96375 TX/PRO/DX INJ NEW DRUG ADDON: CPT | Mod: ER,59

## 2021-05-02 PROCEDURE — 25000003 PHARM REV CODE 250: Mod: ER | Performed by: EMERGENCY MEDICINE

## 2021-05-02 PROCEDURE — 63600175 PHARM REV CODE 636 W HCPCS: Mod: ER | Performed by: EMERGENCY MEDICINE

## 2021-05-02 RX ORDER — IBUPROFEN 600 MG/1
600 TABLET ORAL EVERY 6 HOURS PRN
Qty: 20 TABLET | Refills: 0 | Status: SHIPPED | OUTPATIENT
Start: 2021-05-02 | End: 2023-05-25

## 2021-05-02 RX ORDER — IBUPROFEN 600 MG/1
600 TABLET ORAL
Status: COMPLETED | OUTPATIENT
Start: 2021-05-02 | End: 2021-05-02

## 2021-05-02 RX ORDER — LIDOCAINE HYDROCHLORIDE 10 MG/ML
5 INJECTION INFILTRATION; PERINEURAL
Status: COMPLETED | OUTPATIENT
Start: 2021-05-02 | End: 2021-05-02

## 2021-05-02 RX ORDER — ACETAMINOPHEN 500 MG
500 TABLET ORAL EVERY 6 HOURS PRN
Qty: 30 TABLET | Refills: 0 | Status: SHIPPED | OUTPATIENT
Start: 2021-05-02 | End: 2023-05-25

## 2021-05-02 RX ORDER — DOXYCYCLINE 100 MG/1
100 CAPSULE ORAL 2 TIMES DAILY
Qty: 20 CAPSULE | Refills: 0 | Status: SHIPPED | OUTPATIENT
Start: 2021-05-02 | End: 2021-05-12

## 2021-05-02 RX ORDER — MUPIROCIN 20 MG/G
OINTMENT TOPICAL 2 TIMES DAILY
Qty: 1 TUBE | Refills: 0 | Status: SHIPPED | OUTPATIENT
Start: 2021-05-02 | End: 2021-05-16

## 2021-05-02 RX ORDER — ONDANSETRON 2 MG/ML
4 INJECTION INTRAMUSCULAR; INTRAVENOUS
Status: COMPLETED | OUTPATIENT
Start: 2021-05-02 | End: 2021-05-02

## 2021-05-02 RX ORDER — BUPIVACAINE HYDROCHLORIDE 5 MG/ML
5 INJECTION, SOLUTION EPIDURAL; INTRACAUDAL
Status: COMPLETED | OUTPATIENT
Start: 2021-05-02 | End: 2021-05-02

## 2021-05-02 RX ORDER — OXYCODONE AND ACETAMINOPHEN 5; 325 MG/1; MG/1
1 TABLET ORAL EVERY 6 HOURS PRN
Qty: 8 TABLET | Refills: 0 | Status: SHIPPED | OUTPATIENT
Start: 2021-05-02 | End: 2023-05-25

## 2021-05-02 RX ORDER — FAMOTIDINE 10 MG/ML
20 INJECTION INTRAVENOUS
Status: COMPLETED | OUTPATIENT
Start: 2021-05-02 | End: 2021-05-02

## 2021-05-02 RX ORDER — ONDANSETRON 8 MG/1
8 TABLET, ORALLY DISINTEGRATING ORAL EVERY 6 HOURS PRN
Qty: 30 TABLET | Refills: 0 | Status: SHIPPED | OUTPATIENT
Start: 2021-05-02 | End: 2021-05-04

## 2021-05-02 RX ORDER — FAMOTIDINE 20 MG/1
20 TABLET, FILM COATED ORAL 2 TIMES DAILY
Qty: 20 TABLET | Refills: 0 | Status: SHIPPED | OUTPATIENT
Start: 2021-05-02 | End: 2023-05-25

## 2021-05-02 RX ADMIN — BUPIVACAINE HYDROCHLORIDE 25 MG: 5 INJECTION, SOLUTION EPIDURAL; INTRACAUDAL; PERINEURAL at 07:05

## 2021-05-02 RX ADMIN — LIDOCAINE HYDROCHLORIDE 5 ML: 10 INJECTION, SOLUTION INFILTRATION; PERINEURAL at 07:05

## 2021-05-02 RX ADMIN — BACITRACIN, NEOMYCIN, POLYMYXIN B 1 EACH: 400; 3.5; 5 OINTMENT TOPICAL at 07:05

## 2021-05-02 RX ADMIN — ONDANSETRON 4 MG: 2 INJECTION INTRAMUSCULAR; INTRAVENOUS at 08:05

## 2021-05-02 RX ADMIN — IBUPROFEN 600 MG: 600 TABLET ORAL at 07:05

## 2021-05-02 RX ADMIN — CEFTRIAXONE 2 G: 2 INJECTION, SOLUTION INTRAVENOUS at 07:05

## 2021-05-02 RX ADMIN — FAMOTIDINE 20 MG: 10 INJECTION INTRAVENOUS at 08:05

## 2021-05-04 DIAGNOSIS — Z30.09 BIRTH CONTROL COUNSELING: ICD-10-CM

## 2021-05-05 RX ORDER — DROSPIRENONE AND ETHINYL ESTRADIOL 0.02-3(28)
KIT ORAL
Qty: 28 TABLET | Refills: 1 | OUTPATIENT
Start: 2021-05-05

## 2021-11-18 ENCOUNTER — HOSPITAL ENCOUNTER (EMERGENCY)
Facility: HOSPITAL | Age: 31
Discharge: HOME | End: 2021-11-19
Attending: EMERGENCY MEDICINE
Payer: COMMERCIAL

## 2021-11-18 DIAGNOSIS — K52.9 AGE (ACUTE GASTROENTERITIS): Primary | ICD-10-CM

## 2021-11-18 DIAGNOSIS — R07.9 CHEST PAIN: ICD-10-CM

## 2021-11-18 DIAGNOSIS — R07.89 CHEST WALL PAIN: ICD-10-CM

## 2021-11-18 LAB
ALBUMIN SERPL-MCNC: 4 G/DL (ref 3.3–5.5)
ALBUMIN SERPL-MCNC: 4 G/DL (ref 3.3–5.5)
ALP SERPL-CCNC: 49 U/L (ref 42–141)
ALP SERPL-CCNC: 54 U/L (ref 42–141)
B-HCG UR QL: NEGATIVE
BILIRUB SERPL-MCNC: 0.8 MG/DL (ref 0.2–1.6)
BILIRUB SERPL-MCNC: 0.8 MG/DL (ref 0.2–1.6)
BILIRUBIN, POC UA: NEGATIVE
BLOOD, POC UA: NEGATIVE
BUN SERPL-MCNC: 12 MG/DL (ref 7–22)
CALCIUM SERPL-MCNC: 9.1 MG/DL (ref 8–10.3)
CHLORIDE SERPL-SCNC: 108 MMOL/L (ref 98–108)
CLARITY, POC UA: CLEAR
COLOR, POC UA: YELLOW
CREAT SERPL-MCNC: 0.9 MG/DL (ref 0.6–1.2)
CTP QC/QA: YES
GLUCOSE SERPL-MCNC: 93 MG/DL (ref 73–118)
GLUCOSE, POC UA: NEGATIVE
KETONES, POC UA: NEGATIVE
LEUKOCYTE EST, POC UA: NEGATIVE
NITRITE, POC UA: NEGATIVE
PH UR STRIP: 6 [PH]
POC ALT (SGPT): 18 U/L (ref 10–47)
POC ALT (SGPT): 21 U/L (ref 10–47)
POC AMYLASE: 37 U/L (ref 14–97)
POC AST (SGOT): 23 U/L (ref 11–38)
POC AST (SGOT): 24 U/L (ref 11–38)
POC CARDIAC TROPONIN I: 0 NG/ML
POC GGT: 12 U/L (ref 5–65)
POC TCO2: 28 MMOL/L (ref 18–33)
POTASSIUM BLD-SCNC: 3.7 MMOL/L (ref 3.6–5.1)
PROTEIN, POC UA: NEGATIVE
PROTEIN, POC: 6.5 G/DL (ref 6.4–8.1)
PROTEIN, POC: 6.6 G/DL (ref 6.4–8.1)
SAMPLE: NORMAL
SODIUM BLD-SCNC: 142 MMOL/L (ref 128–145)
SPECIFIC GRAVITY, POC UA: 1.02
UROBILINOGEN, POC UA: 0.2 E.U./DL

## 2021-11-18 PROCEDURE — 93005 ELECTROCARDIOGRAM TRACING: CPT | Mod: ER

## 2021-11-18 PROCEDURE — 81003 URINALYSIS AUTO W/O SCOPE: CPT | Mod: ER

## 2021-11-18 PROCEDURE — 96361 HYDRATE IV INFUSION ADD-ON: CPT | Mod: ER

## 2021-11-18 PROCEDURE — 84484 ASSAY OF TROPONIN QUANT: CPT | Mod: ER

## 2021-11-18 PROCEDURE — 25000003 PHARM REV CODE 250: Mod: ER | Performed by: PHYSICIAN ASSISTANT

## 2021-11-18 PROCEDURE — 93010 EKG 12-LEAD: ICD-10-PCS | Mod: ,,, | Performed by: INTERNAL MEDICINE

## 2021-11-18 PROCEDURE — 99285 EMERGENCY DEPT VISIT HI MDM: CPT | Mod: 25,ER

## 2021-11-18 PROCEDURE — 85025 COMPLETE CBC W/AUTO DIFF WBC: CPT | Mod: ER

## 2021-11-18 PROCEDURE — 80053 COMPREHEN METABOLIC PANEL: CPT | Mod: ER

## 2021-11-18 PROCEDURE — 82150 ASSAY OF AMYLASE: CPT | Mod: ER

## 2021-11-18 PROCEDURE — 96374 THER/PROPH/DIAG INJ IV PUSH: CPT | Mod: ER

## 2021-11-18 PROCEDURE — 63600175 PHARM REV CODE 636 W HCPCS: Mod: ER | Performed by: PHYSICIAN ASSISTANT

## 2021-11-18 PROCEDURE — 93010 ELECTROCARDIOGRAM REPORT: CPT | Mod: ,,, | Performed by: INTERNAL MEDICINE

## 2021-11-18 PROCEDURE — 81025 URINE PREGNANCY TEST: CPT | Mod: ER | Performed by: EMERGENCY MEDICINE

## 2021-11-18 PROCEDURE — 96375 TX/PRO/DX INJ NEW DRUG ADDON: CPT | Mod: ER

## 2021-11-18 PROCEDURE — 96372 THER/PROPH/DIAG INJ SC/IM: CPT | Mod: ER

## 2021-11-18 RX ORDER — LIDOCAINE HYDROCHLORIDE 20 MG/ML
10 SOLUTION OROPHARYNGEAL
Status: COMPLETED | OUTPATIENT
Start: 2021-11-19 | End: 2021-11-18

## 2021-11-18 RX ORDER — DICYCLOMINE HYDROCHLORIDE 10 MG/ML
20 INJECTION INTRAMUSCULAR
Status: COMPLETED | OUTPATIENT
Start: 2021-11-18 | End: 2021-11-18

## 2021-11-18 RX ORDER — ONDANSETRON 2 MG/ML
4 INJECTION INTRAMUSCULAR; INTRAVENOUS
Status: COMPLETED | OUTPATIENT
Start: 2021-11-18 | End: 2021-11-18

## 2021-11-18 RX ORDER — MAG HYDROX/ALUMINUM HYD/SIMETH 200-200-20
30 SUSPENSION, ORAL (FINAL DOSE FORM) ORAL
Status: COMPLETED | OUTPATIENT
Start: 2021-11-19 | End: 2021-11-18

## 2021-11-18 RX ADMIN — LIDOCAINE HYDROCHLORIDE 10 ML: 20 SOLUTION ORAL at 11:11

## 2021-11-18 RX ADMIN — MAGNESIUM HYDROXIDE/ALUMINUM HYDROXICE/SIMETHICONE 30 ML: 120; 1200; 1200 SUSPENSION ORAL at 11:11

## 2021-11-18 RX ADMIN — ONDANSETRON 4 MG: 2 INJECTION INTRAMUSCULAR; INTRAVENOUS at 11:11

## 2021-11-18 RX ADMIN — SODIUM CHLORIDE 1000 ML: 0.9 INJECTION, SOLUTION INTRAVENOUS at 11:11

## 2021-11-18 RX ADMIN — DICYCLOMINE HYDROCHLORIDE 20 MG: 20 INJECTION INTRAMUSCULAR at 11:11

## 2021-11-19 VITALS
HEART RATE: 72 BPM | SYSTOLIC BLOOD PRESSURE: 110 MMHG | DIASTOLIC BLOOD PRESSURE: 69 MMHG | HEIGHT: 64 IN | WEIGHT: 195 LBS | OXYGEN SATURATION: 100 % | TEMPERATURE: 98 F | BODY MASS INDEX: 33.29 KG/M2 | RESPIRATION RATE: 18 BRPM

## 2021-11-19 PROCEDURE — 25000003 PHARM REV CODE 250: Mod: ER | Performed by: EMERGENCY MEDICINE

## 2021-11-19 PROCEDURE — 63600175 PHARM REV CODE 636 W HCPCS: Mod: ER | Performed by: EMERGENCY MEDICINE

## 2021-11-19 RX ORDER — PROMETHAZINE HYDROCHLORIDE 25 MG/1
25 SUPPOSITORY RECTAL EVERY 6 HOURS PRN
Qty: 10 SUPPOSITORY | Refills: 0 | Status: SHIPPED | OUTPATIENT
Start: 2021-11-19 | End: 2023-05-25

## 2021-11-19 RX ORDER — METOCLOPRAMIDE 10 MG/1
10 TABLET ORAL EVERY 6 HOURS
Qty: 30 TABLET | Refills: 0 | Status: SHIPPED | OUTPATIENT
Start: 2021-11-19 | End: 2023-05-25

## 2021-11-19 RX ORDER — METOCLOPRAMIDE HYDROCHLORIDE 5 MG/ML
10 INJECTION INTRAMUSCULAR; INTRAVENOUS
Status: COMPLETED | OUTPATIENT
Start: 2021-11-19 | End: 2021-11-19

## 2021-11-19 RX ADMIN — METOCLOPRAMIDE 10 MG: 5 INJECTION, SOLUTION INTRAMUSCULAR; INTRAVENOUS at 12:11

## 2022-05-11 ENCOUNTER — HOSPITAL ENCOUNTER (EMERGENCY)
Facility: HOSPITAL | Age: 32
Discharge: LEFT BEFORE DISCHARGE | End: 2022-05-11
Payer: COMMERCIAL

## 2022-05-11 VITALS
BODY MASS INDEX: 29.18 KG/M2 | SYSTOLIC BLOOD PRESSURE: 118 MMHG | OXYGEN SATURATION: 98 % | HEART RATE: 91 BPM | TEMPERATURE: 98 F | WEIGHT: 170 LBS | DIASTOLIC BLOOD PRESSURE: 89 MMHG | RESPIRATION RATE: 16 BRPM

## 2022-05-11 DIAGNOSIS — R07.9 CHEST PAIN: ICD-10-CM

## 2022-05-11 LAB
B-HCG UR QL: NEGATIVE
BILIRUBIN, POC UA: NEGATIVE
BLOOD, POC UA: NEGATIVE
CLARITY, POC UA: CLEAR
COLOR, POC UA: YELLOW
CTP QC/QA: YES
GLUCOSE, POC UA: NEGATIVE
KETONES, POC UA: NEGATIVE
LEUKOCYTE EST, POC UA: NEGATIVE
NITRITE, POC UA: NEGATIVE
PH UR STRIP: 6.5 [PH]
PROTEIN, POC UA: NEGATIVE
SPECIFIC GRAVITY, POC UA: 1.01
UROBILINOGEN, POC UA: 0.2 E.U./DL

## 2022-05-11 PROCEDURE — 99900041 HC LEFT WITHOUT BEING SEEN- EMERGENCY: Mod: ER

## 2022-05-11 PROCEDURE — 81003 URINALYSIS AUTO W/O SCOPE: CPT | Mod: ER

## 2022-05-11 PROCEDURE — 81025 URINE PREGNANCY TEST: CPT | Mod: ER | Performed by: EMERGENCY MEDICINE

## 2022-05-11 NOTE — FIRST PROVIDER EVALUATION
Medical screening exam completed.  I have conducted a focused provider triage encounter, findings are as follows:    Brief history of present illness:  32-year-old female presenting to the ED with fatigue, weakness, chest pain that began today.  Reports drinking heavy amount of alcohol last night.    Vitals:    05/11/22 1810   BP: 118/89   BP Location: Right arm   Patient Position: Sitting   Pulse: 91   Resp: 16   Temp: 98.2 °F (36.8 °C)   TempSrc: Oral   SpO2: 98%   Weight: 77.1 kg (170 lb)       Pertinent physical exam:  Well-appearing.  Nondistressed.    Brief workup plan:  EKG, chest x-ray.    Preliminary workup initiated; this workup will be continued and followed by the physician or advanced practice provider that is assigned to the patient when roomed.

## 2023-05-11 ENCOUNTER — TELEPHONE (OUTPATIENT)
Dept: OBSTETRICS AND GYNECOLOGY | Facility: CLINIC | Age: 33
End: 2023-05-11
Payer: COMMERCIAL

## 2023-05-11 NOTE — TELEPHONE ENCOUNTER
"Pt call was returned. Pt is not accepting calls at this time.         ----- Message from Gogo Miller sent at 5/11/2023  3:17 PM CDT -----  Regarding: Sooner Appt  .Type:  Sooner Appointment Request    Patient is requesting a sooner appointment.  Patient declined first available appointment listed as well as another facility and provider .  Patient will not accept being placed on the waitlist and is requesting a message be sent to doctor.    Name of Caller:    When is the first available appointment?    Symptoms: Pt states she feels its a "life or death" situation. Pt states her depression is getting worse, she's having increased hot flashes at night, and her testosterone levels are extremely low. She has been getting no sleep at all at night but her body wants to sleep during the day but she cant because she has a 3 year old. Her estrogen levels are low as well. Pt has had a hysterectomy and was told to give her body time to adjust but she feels something is wrong and wants to be seen as soon as possible. Please advise.    Would the patient rather a call back or a response via [facility]? Call back    Best Call Back Number: [phone]     Additional Information:    "

## 2023-05-12 ENCOUNTER — TELEPHONE (OUTPATIENT)
Dept: OBSTETRICS AND GYNECOLOGY | Facility: CLINIC | Age: 33
End: 2023-05-12
Payer: COMMERCIAL

## 2023-05-25 ENCOUNTER — OFFICE VISIT (OUTPATIENT)
Dept: OBSTETRICS AND GYNECOLOGY | Facility: CLINIC | Age: 33
End: 2023-05-25
Payer: COMMERCIAL

## 2023-05-25 VITALS
HEIGHT: 63 IN | SYSTOLIC BLOOD PRESSURE: 128 MMHG | BODY MASS INDEX: 32.81 KG/M2 | DIASTOLIC BLOOD PRESSURE: 72 MMHG | WEIGHT: 185.19 LBS

## 2023-05-25 DIAGNOSIS — N99.3 VAGINAL VAULT PROLAPSE AFTER HYSTERECTOMY: Primary | ICD-10-CM

## 2023-05-25 PROBLEM — N94.89 ENDOMETRIAL MASS: Status: RESOLVED | Noted: 2018-11-13 | Resolved: 2023-05-25

## 2023-05-25 PROBLEM — F32.9 MAJOR DEPRESSION: Chronic | Status: ACTIVE | Noted: 2018-10-12

## 2023-05-25 PROBLEM — L70.8 ADULT PREMENSTRUAL ACNE: Status: RESOLVED | Noted: 2018-11-13 | Resolved: 2023-05-25

## 2023-05-25 PROBLEM — N97.9 FEMALE FERTILITY PROBLEMS: Status: RESOLVED | Noted: 2019-08-27 | Resolved: 2023-05-25

## 2023-05-25 PROBLEM — E28.9 OVARIAN DYSFUNCTION, UNSPECIFIED: Status: RESOLVED | Noted: 2019-08-27 | Resolved: 2023-05-25

## 2023-05-25 PROCEDURE — 3008F PR BODY MASS INDEX (BMI) DOCUMENTED: ICD-10-PCS | Mod: CPTII,S$GLB,, | Performed by: OBSTETRICS & GYNECOLOGY

## 2023-05-25 PROCEDURE — 3074F SYST BP LT 130 MM HG: CPT | Mod: CPTII,S$GLB,, | Performed by: OBSTETRICS & GYNECOLOGY

## 2023-05-25 PROCEDURE — 3078F DIAST BP <80 MM HG: CPT | Mod: CPTII,S$GLB,, | Performed by: OBSTETRICS & GYNECOLOGY

## 2023-05-25 PROCEDURE — 1159F MED LIST DOCD IN RCRD: CPT | Mod: CPTII,S$GLB,, | Performed by: OBSTETRICS & GYNECOLOGY

## 2023-05-25 PROCEDURE — 99203 PR OFFICE/OUTPT VISIT, NEW, LEVL III, 30-44 MIN: ICD-10-PCS | Mod: S$GLB,,, | Performed by: OBSTETRICS & GYNECOLOGY

## 2023-05-25 PROCEDURE — 99999 PR PBB SHADOW E&M-EST. PATIENT-LVL III: CPT | Mod: PBBFAC,,, | Performed by: OBSTETRICS & GYNECOLOGY

## 2023-05-25 PROCEDURE — 99999 PR PBB SHADOW E&M-EST. PATIENT-LVL III: ICD-10-PCS | Mod: PBBFAC,,, | Performed by: OBSTETRICS & GYNECOLOGY

## 2023-05-25 PROCEDURE — 3074F PR MOST RECENT SYSTOLIC BLOOD PRESSURE < 130 MM HG: ICD-10-PCS | Mod: CPTII,S$GLB,, | Performed by: OBSTETRICS & GYNECOLOGY

## 2023-05-25 PROCEDURE — 99203 OFFICE O/P NEW LOW 30 MIN: CPT | Mod: S$GLB,,, | Performed by: OBSTETRICS & GYNECOLOGY

## 2023-05-25 PROCEDURE — 1160F RVW MEDS BY RX/DR IN RCRD: CPT | Mod: CPTII,S$GLB,, | Performed by: OBSTETRICS & GYNECOLOGY

## 2023-05-25 PROCEDURE — 1159F PR MEDICATION LIST DOCUMENTED IN MEDICAL RECORD: ICD-10-PCS | Mod: CPTII,S$GLB,, | Performed by: OBSTETRICS & GYNECOLOGY

## 2023-05-25 PROCEDURE — 99213 OFFICE O/P EST LOW 20 MIN: CPT | Mod: PBBFAC | Performed by: OBSTETRICS & GYNECOLOGY

## 2023-05-25 PROCEDURE — 3078F PR MOST RECENT DIASTOLIC BLOOD PRESSURE < 80 MM HG: ICD-10-PCS | Mod: CPTII,S$GLB,, | Performed by: OBSTETRICS & GYNECOLOGY

## 2023-05-25 PROCEDURE — 1160F PR REVIEW ALL MEDS BY PRESCRIBER/CLIN PHARMACIST DOCUMENTED: ICD-10-PCS | Mod: CPTII,S$GLB,, | Performed by: OBSTETRICS & GYNECOLOGY

## 2023-05-25 PROCEDURE — 3008F BODY MASS INDEX DOCD: CPT | Mod: CPTII,S$GLB,, | Performed by: OBSTETRICS & GYNECOLOGY

## 2023-05-25 RX ORDER — ARIPIPRAZOLE 10 MG/1
10 TABLET ORAL
COMMUNITY
Start: 2023-05-12

## 2023-05-25 RX ORDER — ESTERIFIED ESTROGEN AND METHYLTESTOSTERONE 1.25; 2.5 MG/1; MG/1
1 TABLET ORAL
COMMUNITY
Start: 2023-05-12

## 2023-05-25 RX ORDER — CITALOPRAM 40 MG/1
40 TABLET, FILM COATED ORAL
COMMUNITY
Start: 2023-04-20

## 2023-05-25 RX ORDER — CLINDAMYCIN PHOSPHATE 10 MG/G
1 GEL TOPICAL
COMMUNITY
Start: 2023-05-18

## 2023-05-25 RX ORDER — NAPROXEN SODIUM 220 MG/1
81 TABLET, FILM COATED ORAL
COMMUNITY

## 2023-05-25 NOTE — PROGRESS NOTES
Subjective:      Patient ID: Marly Mayfield is a 33 y.o. female.    Chief Complaint:  Gynecologic Exam (NP here for rectocele and cystocele. Pt had a hysterectomy on 2023 for PMDD and rectocele)      History of Present Illness  HPI  Patient comes in today for consultation  Patient of Dr Labadie and Tran.  Long history of psychiatric issues.  Seeing her psychiatrist.  Severe PMDD. Suicidal and severe depression every month  Also with history of uterine and vaginal vault prolapse    She underwent Total laparoscopic hysterectomy with bilateral salpingo-oophorectomy on 3/1/2023 with anterior and posterior repair.  Returned on 3/22/2023 with vaginal cuff bleeding for repair.    Thinks her vagina is again falling out.  But does not feel like her gynecologist is listening.  Would like a second opinion of further management plan.      GYN & OB History  Patient's last menstrual period was 2022 (exact date).   Date of Last Pap: No result found    OB History    Para Term  AB Living   3 2 1 1   2   SAB IAB Ectopic Multiple Live Births           2      # Outcome Date GA Lbr René/2nd Weight Sex Delivery Anes PTL Lv   3   36w0d  3.572 kg (7 lb 14 oz) F    CONCEPCION   2 Term 12 38w0d  3.062 kg (6 lb 12 oz) F Vag-Spont EPI N CONCEPCION   1               Past Medical History:   Diagnosis Date    Addiction to drug     THCA as a teenager, last smoked 8 years ago    Anxiety     Bipolar disorder     Bronchitis, chronic     Hallucination     Headache     Hx of psychiatric care     Cait     Psychiatric problem     Psychosis     Sleep difficulties     Substance abuse     Suicide attempt     Therapy        Past Surgical History:   Procedure Laterality Date    BREAST SURGERY      HYSTERECTOMY  2023    Dr. Juan Labadie and Layne Duarte    REPAIR OF VAGINAL CUFF  2023    VAGINAL HYSTERECTOMY W/ ANTERIOR AND POSTERIOR VAGINAL REPAIR  2023    Drs. J Labadie and Layne Duarte       Family History    Problem Relation Age of Onset    Hypertension Mother     Liver disease Father     Bipolar disorder Father     Schizophrenia Father     Alcohol abuse Father     Drug abuse Father     Anxiety disorder Paternal Grandfather     Breast cancer Neg Hx     Colon cancer Neg Hx     Ovarian cancer Neg Hx        Social History     Socioeconomic History    Marital status:      Spouse name: Ignacio    Number of children: 2   Occupational History    Occupation: [employer]     Comment: [employer]   Tobacco Use    Smoking status: Former     Packs/day: 0.50     Years: 4.00     Pack years: 2.00     Types: Vaping with nicotine, Cigarettes    Smokeless tobacco: Never   Substance and Sexual Activity    Alcohol use: Yes     Alcohol/week: 0.8 standard drinks     Types: 1 Standard drinks or equivalent per week     Comment: social    Drug use: Not Currently     Types: Marijuana     Comment: stopped 8 years ago at age 20    Sexual activity: Yes     Partners: Male, Female     Birth control/protection: Injection     Comment: presently male only   Other Topics Concern    Patient feels they ought to cut down on drinking/drug use No    Patient annoyed by others criticizing their drinking/drug use No    Patient has felt bad or guilty about drinking/drug use No    Patient has had a drink/used drugs as an eye opener in the AM No   Social History Narrative    Lives with  and two kids.    Mother passed away 08/29/2018.    Father is in longterm for murdering her stepsister.       Current Outpatient Medications   Medication Sig Dispense Refill    ARIPiprazole (ABILIFY) 10 MG Tab Take 10 mg by mouth.      aspirin 81 MG Chew Take 81 mg by mouth.      citalopram (CELEXA) 40 MG tablet Take 40 mg by mouth.      clindamycin phosphate 1% (CLINDAGEL) 1 % gel Apply 1 application topically.      estrogens,esterified,-methyltestosterone 1.25-2.5mg (ESTRATEST) per tablet Take 1 tablet by mouth.       No current facility-administered medications for this  "visit.       Review of patient's allergies indicates:   Allergen Reactions    Sulfa (sulfonamide antibiotics) Anaphylaxis and Swelling    Sertraline      Other reaction(s): Other (See Comments)  MAKES "ME GO CRAZY"    Megestrol Hives and Other (See Comments)     pimples       Review of Systems  Review of Systems   Constitutional:  Negative for activity change, appetite change, chills, fatigue, fever and unexpected weight change.   HENT:  Negative for mouth sores.    Respiratory:  Negative for cough, shortness of breath and wheezing.    Cardiovascular:  Negative for chest pain and palpitations.   Gastrointestinal:  Negative for abdominal pain, bloating, blood in stool, constipation, nausea and vomiting.   Endocrine: Negative for diabetes and hot flashes.   Genitourinary:  Negative for dysmenorrhea, dyspareunia, dysuria, frequency, hematuria, menorrhagia, menstrual problem, pelvic pain, urgency, vaginal bleeding, vaginal discharge, vaginal pain, urinary incontinence, postcoital bleeding and vaginal odor.        Vaginal vault prolapse post-hysterectomy   Musculoskeletal:  Negative for back pain and myalgias.   Integumentary:  Negative for rash, breast mass and nipple discharge.   Neurological:  Negative for seizures and headaches.   Psychiatric/Behavioral:  Positive for depression. Negative for sleep disturbance. The patient is nervous/anxious.    Breast: Negative for mass, mastodynia and nipple discharge       Objective:     Physical Exam:   Constitutional: She appears well-developed and well-nourished. No distress.   BMI of 32.80    HENT:   Head: Normocephalic and atraumatic.    Eyes: EOM are normal.      Pulmonary/Chest: Effort normal. No respiratory distress.   Breasts: Non-tender, no engorgement, no masses, no retraction, no discharge. Negative for lymphadenopathy.         Abdominal: Soft. She exhibits no distension. There is no abdominal tenderness. There is no rebound and no guarding.   Trochar scars healed " well.     Genitourinary:    Vagina normal.   No  no vaginal discharge in the vagina.    Genitourinary Comments: Vulva without any obvious lesions.  Urethral meatus normal size and location without any lesion.  Urethra is non-tender without stricture or discharge.  Bladder is non-tender.  Vaginal vault with good support.  Minimal white discharge noted.  No obvious lesion.  Normal rugation.  Vaginal cuff is intact and well-supported.  Small granulation areas noted.  Cervix and Uterus are surgically absent.  Adnexa is without any masses or tenderness.  Perineum without obvious lesion.  No significant cystocele or rectocele even with Valsalva               Musculoskeletal: Normal range of motion.       Neurological: She is alert.    Skin: Skin is warm and dry.    Psychiatric: She has a normal mood and affect.       Assessment:     1. Vaginal vault prolapse after hysterectomy             Plan:     I have discussed with the patient regarding her condition.  She does not seem to have objective evidence of vaginal vault prolapse  I told her that the A&PRx seemed to hold up well.  We discussed heavy lifting and avoiding increased abdominal pressure    She would need to return to her Psychiatrist for continuing care  Needs to go back to her PCP for proper work-up of fatigue  She can continue with current HRT.  Dosage was increased without improvement of symptoms.  I advised her to continue with previous lower dose.    Back to Dr. Labadie.

## 2023-10-23 ENCOUNTER — TELEPHONE (OUTPATIENT)
Dept: FAMILY MEDICINE | Facility: CLINIC | Age: 33
End: 2023-10-23
Payer: COMMERCIAL

## 2023-10-23 NOTE — TELEPHONE ENCOUNTER
----- Message from Juanis Singh MA sent at 10/23/2023  2:11 PM CDT -----  Type: Patient Call Back    Who called: Self    What is the request in detail:pt. Is asking to be seen for menopausal issues as well as hand pain ..     Can the clinic reply by [facility]?NO    Would the patient rather a call back or a response via [facility]? Yes, call     Best call back number: [phone] (work)

## 2024-02-23 ENCOUNTER — HOSPITAL ENCOUNTER (EMERGENCY)
Facility: HOSPITAL | Age: 34
Discharge: HOME | End: 2024-02-23
Attending: EMERGENCY MEDICINE
Payer: COMMERCIAL

## 2024-02-23 VITALS
WEIGHT: 190 LBS | RESPIRATION RATE: 18 BRPM | OXYGEN SATURATION: 100 % | SYSTOLIC BLOOD PRESSURE: 151 MMHG | TEMPERATURE: 98 F | HEART RATE: 83 BPM | HEIGHT: 63 IN | BODY MASS INDEX: 33.66 KG/M2 | DIASTOLIC BLOOD PRESSURE: 84 MMHG

## 2024-02-23 DIAGNOSIS — L02.411 ABSCESS OF RIGHT AXILLA: Primary | ICD-10-CM

## 2024-02-23 PROCEDURE — 25000003 PHARM REV CODE 250: Mod: ER | Performed by: EMERGENCY MEDICINE

## 2024-02-23 PROCEDURE — 87186 SC STD MICRODIL/AGAR DIL: CPT | Performed by: EMERGENCY MEDICINE

## 2024-02-23 PROCEDURE — 87070 CULTURE OTHR SPECIMN AEROBIC: CPT | Performed by: EMERGENCY MEDICINE

## 2024-02-23 PROCEDURE — 10060 I&D ABSCESS SIMPLE/SINGLE: CPT | Mod: ER

## 2024-02-23 PROCEDURE — 99284 EMERGENCY DEPT VISIT MOD MDM: CPT | Mod: ER,25

## 2024-02-23 PROCEDURE — 87077 CULTURE AEROBIC IDENTIFY: CPT | Performed by: EMERGENCY MEDICINE

## 2024-02-23 RX ORDER — NAPROXEN 500 MG/1
500 TABLET ORAL 2 TIMES DAILY WITH MEALS
Qty: 20 TABLET | Refills: 0 | Status: SHIPPED | OUTPATIENT
Start: 2024-02-23 | End: 2024-03-04

## 2024-02-23 RX ORDER — MUPIROCIN 20 MG/G
OINTMENT TOPICAL 2 TIMES DAILY
Qty: 22 G | Refills: 0 | Status: SHIPPED | OUTPATIENT
Start: 2024-02-23 | End: 2024-03-04

## 2024-02-23 RX ORDER — LIDOCAINE HYDROCHLORIDE AND EPINEPHRINE 5; 5 MG/ML; UG/ML
10 INJECTION, SOLUTION INFILTRATION; PERINEURAL ONCE
Status: COMPLETED | OUTPATIENT
Start: 2024-02-23 | End: 2024-02-23

## 2024-02-23 RX ORDER — DOXYCYCLINE 100 MG/1
100 CAPSULE ORAL EVERY 12 HOURS
Qty: 20 CAPSULE | Refills: 0 | Status: SHIPPED | OUTPATIENT
Start: 2024-02-23 | End: 2024-03-04

## 2024-02-23 RX ORDER — AMOXICILLIN AND CLAVULANATE POTASSIUM 875; 125 MG/1; MG/1
1 TABLET, FILM COATED ORAL 2 TIMES DAILY
Qty: 20 TABLET | Refills: 0 | Status: SHIPPED | OUTPATIENT
Start: 2024-02-23 | End: 2024-03-04

## 2024-02-23 RX ADMIN — LIDOCAINE HYDROCHLORIDE,EPINEPHRINE BITARTRATE 10 ML: 5; .005 INJECTION, SOLUTION INFILTRATION; PERINEURAL at 08:02

## 2024-02-23 RX ADMIN — BACITRACIN ZINC, NEOMYCIN, POLYMYXIN B 1 EACH: 400; 3.5; 5 OINTMENT TOPICAL at 08:02

## 2024-02-24 NOTE — DISCHARGE INSTRUCTIONS
Remove packing on Sunday    May wash wound with clean water and soap, then dry thoroughly. Keep bandage clean and dry. Change bandage daily and when soiled. To not expose wound to debris, harsh chemicals or dirty water (i.e. dishwater, pool water, lake/river/pond etc.).

## 2024-02-24 NOTE — ED TRIAGE NOTES
Pt arrived to the ED via personal transport due to right axillary access x3 days. Abscess red in appearance. Pt denies fever, nausea, and/or vomiting. Alert and oriented x4.

## 2024-02-24 NOTE — ED PROVIDER NOTES
"Encounter Date: 2/23/2024    SCRIBE #1 NOTE: I, ABIDA Gunter, am scribing for, and in the presence of,  Blanca Reyna MD. I have scribed the following portions of the note - Other sections scribed: HPI, ROS, PE.       History     Chief Complaint   Patient presents with    Abscess      Possible early abscess  -Red  bump to  right axilla - c/o pain      Marly Mayfield is a 33 y.o. female, with no pertinent history, who presents to the ED with pain/swelling to right axilla which started 2 days ago. Patient reports she initially thought it was an ingrown hair since she shaved the area 4 days ago. She attempted to treat it with a draining salve and tried squeezing it, but it would not drain. She denies any weakness, numbness, fever, or paraesthesia.     The history is provided by the patient. No  was used.     Review of patient's allergies indicates:   Allergen Reactions    Sulfa (sulfonamide antibiotics) Anaphylaxis and Swelling    Sertraline      Other reaction(s): Other (See Comments)  MAKES "ME GO CRAZY"    Megestrol Hives and Other (See Comments)     pimples     Past Medical History:   Diagnosis Date    Addiction to drug     THCA as a teenager, last smoked 8 years ago    Anxiety     Bipolar disorder     Bronchitis, chronic     Hallucination     Headache     Hx of psychiatric care     Cait     Psychiatric problem     Psychosis     Sleep difficulties     Substance abuse     Suicide attempt     Therapy      Past Surgical History:   Procedure Laterality Date    BREAST SURGERY      HYSTERECTOMY  03/01/2023    Dr. Juan Labadie and Layne Duarte    REPAIR OF VAGINAL CUFF  03/22/2023    VAGINAL HYSTERECTOMY W/ ANTERIOR AND POSTERIOR VAGINAL REPAIR  03/01/2023    Drs. J Labadie and Layne Duarte     Family History   Problem Relation Age of Onset    Hypertension Mother     Liver disease Father     Bipolar disorder Father     Schizophrenia Father     Alcohol abuse Father     Drug abuse Father     Anxiety disorder " Paternal Grandfather     Breast cancer Neg Hx     Colon cancer Neg Hx     Ovarian cancer Neg Hx      Social History     Tobacco Use    Smoking status: Former     Current packs/day: 0.50     Average packs/day: 0.5 packs/day for 4.0 years (2.0 ttl pk-yrs)     Types: Vaping with nicotine, Cigarettes    Smokeless tobacco: Never   Substance Use Topics    Alcohol use: Yes     Alcohol/week: 0.8 standard drinks of alcohol     Types: 1 Standard drinks or equivalent per week     Comment: social    Drug use: Not Currently     Types: Marijuana     Comment: stopped 8 years ago at age 20     Review of Systems   Constitutional:  Negative for fever.   Respiratory:  Negative for shortness of breath.    Cardiovascular:  Negative for chest pain and leg swelling.   Musculoskeletal:  Negative for arthralgias, joint swelling and myalgias.   Neurological:  Negative for weakness and numbness.   Hematological:  Negative for adenopathy.   All other systems reviewed and are negative.      Physical Exam     Initial Vitals [02/23/24 1844]   BP Pulse Resp Temp SpO2   135/84 84 16 98.1 °F (36.7 °C) 99 %      MAP       --         Physical Exam    Nursing note and vitals reviewed.  Constitutional: She appears well-developed and well-nourished. She is not diaphoretic. No distress.   HENT:   Head: Normocephalic and atraumatic.   Mouth/Throat: Oropharynx is clear and moist. No oropharyngeal exudate.   Eyes: EOM are normal. Pupils are equal, round, and reactive to light.   Neck: Neck supple.   Normal range of motion.  Cardiovascular:  Normal rate, regular rhythm and intact distal pulses.           No murmur heard.  Pulses:       Radial pulses are 2+ on the right side and 2+ on the left side.   Pulmonary/Chest: Breath sounds normal. No stridor. No respiratory distress.   Abdominal: Abdomen is soft. Bowel sounds are normal. There is no abdominal tenderness.   Musculoskeletal:         General: No tenderness or edema. Normal range of motion.      Right  shoulder: Normal range of motion.      Cervical back: Normal range of motion and neck supple.      Comments: 2 cm of induration and fluctuance.  Overlying and surrounding erythema 3x2 cm in length.  Skin intact without exudates.        Neurological: She is alert and oriented to person, place, and time. She has normal strength. No cranial nerve deficit.   Skin: Skin is warm and dry. No erythema. No pallor.   Psychiatric: She has a normal mood and affect.         ED Course   I & D - Incision and Drainage    Date/Time: 2024 8:25 PM  Location procedure was performed: [facility] EMERGENCY DEPARTMENT    Performed by: Blanca Reyna MD  Authorized by: Blanca Reyna MD  Consent Done: Yes  Consent: Verbal consent obtained.  Risks and benefits: risks, benefits and alternatives were discussed  Consent given by: patient  Patient understanding: patient states understanding of the procedure being performed  Patient identity confirmed:  and name  Type: abscess  Body area: upper extremity (right axilla)  Anesthesia: local infiltration    Anesthesia:  Local anesthetic: Lidocaine 0.5% with epinephrine [id number].  Anesthetic total: 3 mL  Risk factor: underlying major vessel and underlying major nerve  Scalpel size: 11  Incision type: single straight  Incision depth: dermal  Complexity: simple  Drainage: bloody and pus  Drainage amount: scant  Wound treatment: incision, drainage, deloculation and expression of material  Packing material: 1/4 in iodoform gauze  Complications: No  Specimens: Yes (Wound culture sent, results pending at time of ED disposition)  Patient tolerance: Patient tolerated the procedure well with no immediate complications  Comments: Neurovascular status intact before and after procedure done    Incision depth: dermal        Labs Reviewed   CULTURE, AEROBIC  (SPECIFY SOURCE) - Abnormal; Notable for the following components:       Result Value    Aerobic Bacterial Culture   (*)     Value: STAPHYLOCOCCUS  AUREUS  Many      All other components within normal limits          Imaging Results    None          Medications   LIDOcaine-EPINEPHrine 0.5%-1:200,000 injection 10 mL (10 mLs Intradermal Given by Provider 2/23/24 2034)   neomycin-bacitracnZn-polymyxnB packet (1 each Topical (Top) Given by Provider 2/23/24 2035)     Medical Decision Making  Risk  OTC drugs.  Prescription drug management.            Scribe Attestation:   Scribe #1: I performed the above scribed service and the documentation accurately describes the services I performed. I attest to the accuracy of the note.        ED Course as of 03/01/24 0400   Fri Feb 23, 2024 2001 DDX: Abscess, cellulitis, erysipelas, folliculitis, contact dermatitis, atopic dermatitis, viral exanthem, drug eruption, tinea and others     Last tetanus 2/27/2020   [DL]      ED Course User Index  [DL] Blanca Reyna MD               Medical Decision Making:   Initial Assessment:   Patient is afebrile, well-appearing, immunocompetent with exam findings consistent with abscess of the right axilla with intact distal perfusion, soft compartments. No crepitus. No bullae. No extremity edema. No lymphangitic streaking. No palpable cord. No disproportionate pain to exam.  Differential Diagnosis:   Abscess, cellulitis, erysipelas, folliculitis, lymphadenitis, others  I do not suspect:  Necrotizing fasciitis, gas gangrene, lymphangitis, osteomyelitis, septic arthritis/bursitis, pyomyositis, vasculitis or other complicated severe emergent condition.    Clinical Tests:   Lab Tests: Ordered and Reviewed  ED Management:  I&D performed in ED, wound packed with iodoform gauze. Wound culture pending at time of ED disposition.  Outpatient management plan, outpatient PCP follow-up and ED return precautions discussed with patient with understanding and agreement.           Labs Reviewed         Imaging Reviewed    Imaging Results    None         Medications given in ED    Medications    LIDOcaine-EPINEPHrine 0.5%-1:200,000 injection 10 mL (10 mLs Intradermal Given by Provider 2/23/24 2034)   neomycin-bacitracnZn-polymyxnB packet (1 each Topical (Top) Given by Provider 2/23/24 2035)         Note was created using voice recognition software. Note may have occasional typographical errors that may not have been identified and edited despite good glenn initial review prior to signing.    I, Blanca Reyna MD, personally performed the services described in this documentation. All medical record entries made by the scribe were at my direction and in my presence.  I have reviewed the chart and agree that the record reflects my personal performance and is accurate and complete.      Clinical Impression:  Final diagnoses:  [L02.411] Abscess of right axilla (Primary)          ED Disposition Condition    Discharge Stable          ED Prescriptions       Medication Sig Dispense Start Date End Date Auth. Provider    amoxicillin-clavulanate 875-125mg (AUGMENTIN) 875-125 mg per tablet Take 1 tablet by mouth 2 (two) times daily. for 10 days 20 tablet 2/23/2024 3/4/2024 Blanca Reyna MD    doxycycline (MONODOX) 100 MG capsule Take 1 capsule (100 mg total) by mouth every 12 (twelve) hours. for 10 days 20 capsule 2/23/2024 3/4/2024 Blanca Reyna MD    naproxen (NAPROSYN) 500 MG tablet Take 1 tablet (500 mg total) by mouth 2 (two) times daily with meals. for 10 days 20 tablet 2/23/2024 3/4/2024 Blanca Reyna MD    mupirocin (BACTROBAN) 2 % ointment Apply topically 2 (two) times daily. for 10 days 22 g 2/23/2024 3/4/2024 Blanca Reyna MD          Follow-up Information       Follow up With Specialties Details Why Contact Info    The nearest emergency department.  Go to  As needed, If symptoms worsen     Your PCP  Call  As needed, for ongoing care              Blanca Reyna MD  03/01/24 2557

## 2024-02-26 LAB — BACTERIA SPEC AEROBE CULT: ABNORMAL

## 2024-11-11 ENCOUNTER — OFFICE VISIT (OUTPATIENT)
Dept: FAMILY MEDICINE | Facility: CLINIC | Age: 34
End: 2024-11-11
Payer: COMMERCIAL

## 2024-11-11 VITALS
WEIGHT: 199.94 LBS | TEMPERATURE: 98 F | BODY MASS INDEX: 35.43 KG/M2 | OXYGEN SATURATION: 98 % | SYSTOLIC BLOOD PRESSURE: 124 MMHG | DIASTOLIC BLOOD PRESSURE: 86 MMHG | HEART RATE: 103 BPM | HEIGHT: 63 IN

## 2024-11-11 DIAGNOSIS — Z23 NEED FOR VACCINATION: ICD-10-CM

## 2024-11-11 DIAGNOSIS — Z00.00 PHYSICAL EXAM: Primary | ICD-10-CM

## 2024-11-11 DIAGNOSIS — Z98.890: ICD-10-CM

## 2024-11-11 DIAGNOSIS — Z98.82 HISTORY OF BREAST AUGMENTATION: ICD-10-CM

## 2024-11-11 DIAGNOSIS — I10 HYPERTENSION, ESSENTIAL: ICD-10-CM

## 2024-11-11 DIAGNOSIS — Z79.890 HORMONE REPLACEMENT THERAPY: ICD-10-CM

## 2024-11-11 DIAGNOSIS — Z90.710 S/P COMPLETE HYSTERECTOMY: ICD-10-CM

## 2024-11-11 DIAGNOSIS — O24.419 GESTATIONAL DIABETES MELLITUS (GDM), ANTEPARTUM, GESTATIONAL DIABETES METHOD OF CONTROL UNSPECIFIED: ICD-10-CM

## 2024-11-11 DIAGNOSIS — Z71.89 COUNSELING FOR ESTROGEN REPLACEMENT THERAPY: ICD-10-CM

## 2024-11-11 DIAGNOSIS — O14.90 PRE-ECLAMPSIA, ANTEPARTUM: ICD-10-CM

## 2024-11-11 DIAGNOSIS — N81.10 CYSTOCELE WITH RECTOCELE: ICD-10-CM

## 2024-11-11 DIAGNOSIS — N81.6 CYSTOCELE WITH RECTOCELE: ICD-10-CM

## 2024-11-11 DIAGNOSIS — R53.83 FATIGUE, UNSPECIFIED TYPE: ICD-10-CM

## 2024-11-11 DIAGNOSIS — F32.9 MAJOR DEPRESSIVE DISORDER WITH CURRENT ACTIVE EPISODE, UNSPECIFIED DEPRESSION EPISODE SEVERITY, UNSPECIFIED WHETHER RECURRENT: Chronic | ICD-10-CM

## 2024-11-11 DIAGNOSIS — G43.009 MIGRAINE WITHOUT AURA AND WITHOUT STATUS MIGRAINOSUS, NOT INTRACTABLE: ICD-10-CM

## 2024-11-11 PROCEDURE — 3008F BODY MASS INDEX DOCD: CPT | Mod: CPTII,S$GLB,, | Performed by: FAMILY MEDICINE

## 2024-11-11 PROCEDURE — 90471 IMMUNIZATION ADMIN: CPT | Mod: S$GLB,,, | Performed by: FAMILY MEDICINE

## 2024-11-11 PROCEDURE — 99213 OFFICE O/P EST LOW 20 MIN: CPT | Mod: 25,S$GLB,, | Performed by: FAMILY MEDICINE

## 2024-11-11 PROCEDURE — 99385 PREV VISIT NEW AGE 18-39: CPT | Mod: 25,S$GLB,, | Performed by: FAMILY MEDICINE

## 2024-11-11 PROCEDURE — 3074F SYST BP LT 130 MM HG: CPT | Mod: CPTII,S$GLB,, | Performed by: FAMILY MEDICINE

## 2024-11-11 PROCEDURE — 99999 PR PBB SHADOW E&M-EST. PATIENT-LVL IV: CPT | Mod: PBBFAC,,, | Performed by: FAMILY MEDICINE

## 2024-11-11 PROCEDURE — 90656 IIV3 VACC NO PRSV 0.5 ML IM: CPT | Mod: S$GLB,,, | Performed by: FAMILY MEDICINE

## 2024-11-11 PROCEDURE — 3079F DIAST BP 80-89 MM HG: CPT | Mod: CPTII,S$GLB,, | Performed by: FAMILY MEDICINE

## 2024-11-11 PROCEDURE — 1159F MED LIST DOCD IN RCRD: CPT | Mod: CPTII,S$GLB,, | Performed by: FAMILY MEDICINE

## 2024-11-11 PROCEDURE — 1160F RVW MEDS BY RX/DR IN RCRD: CPT | Mod: CPTII,S$GLB,, | Performed by: FAMILY MEDICINE

## 2024-11-11 RX ORDER — LAMOTRIGINE 25 MG/1
25 TABLET ORAL 2 TIMES DAILY
Qty: 60 TABLET | Refills: 0 | Status: SHIPPED | OUTPATIENT
Start: 2024-11-11

## 2024-11-11 RX ORDER — LAMOTRIGINE 25 MG/1
25 TABLET ORAL 2 TIMES DAILY
COMMUNITY
End: 2024-11-11 | Stop reason: SDUPTHER

## 2024-11-12 PROBLEM — N81.10 CYSTOCELE WITH RECTOCELE: Status: ACTIVE | Noted: 2024-11-12

## 2024-11-12 PROBLEM — Z98.890: Status: ACTIVE | Noted: 2024-11-12

## 2024-11-12 PROBLEM — R53.83 FATIGUE: Status: ACTIVE | Noted: 2024-11-12

## 2024-11-12 PROBLEM — Z98.82 HISTORY OF BREAST AUGMENTATION: Status: ACTIVE | Noted: 2024-11-12

## 2024-11-12 PROBLEM — Z90.710 S/P COMPLETE HYSTERECTOMY: Status: ACTIVE | Noted: 2024-11-12

## 2024-11-12 PROBLEM — O24.419 GESTATIONAL DIABETES MELLITUS (GDM), ANTEPARTUM: Status: ACTIVE | Noted: 2024-11-12

## 2024-11-12 PROBLEM — N81.6 CYSTOCELE WITH RECTOCELE: Status: ACTIVE | Noted: 2024-11-12

## 2024-11-12 PROBLEM — I10 HYPERTENSION, ESSENTIAL: Status: ACTIVE | Noted: 2024-11-12

## 2024-11-13 NOTE — PROGRESS NOTES
Subjective:       Patient ID: Marly Mayfield is a 34 y.o. female.    Chief Complaint: Establish Care    Here for new patient visit.    Social history former smoker quit in .  Quit alcohol in .  No exercise.  Stay-at-home mom.  Three children 12 10 and 4.    Family history diabetes mellitus type 2 coronary artery disease in both parents.  Grandmother with CVA.  Dementia in father.  Hypertension both parents.  Some mental illness in the family.    Immunizations TD AP is current.  Needs flu vaccine.    Past medical history.  BMI of 35.4.   4 para 3 AB1.  Normal vaginal deliveries all were premature.  Gestational diabetes with last pregnancy.  Had preeclampsia.  Complete hysterectomy.  Breast augmentation.  Vaginal cuff repair.  BMI of 35.4 to 239 lb down to 200 lb but unable to lose more.  Hypertension new problem.  Cystocele or rectocele.  Estrogen replacement.  Fatigue.  Major depression on Abilify.  Her current psychiatrist is now off her insurance.  Needs new 1.  Literally has tried 17 different medicines over the past 3 years.  None have been effective.  Also has migraine headaches.      Review of Systems   Constitutional: Negative.    HENT: Negative.     Eyes: Negative.    Respiratory: Negative.     Cardiovascular: Negative.    Gastrointestinal: Negative.    Endocrine: Negative.    Genitourinary: Negative.    Musculoskeletal: Negative.    Skin: Negative.    Allergic/Immunologic: Negative.    Neurological:  Positive for headaches.   Hematological: Negative.    Psychiatric/Behavioral:  Positive for dysphoric mood.    All other systems reviewed and are negative.      Objective:      Physical Exam  Vitals and nursing note reviewed.   Constitutional:       Appearance: Normal appearance. She is well-developed. She is obese.   HENT:      Head: Normocephalic and atraumatic.      Right Ear: Tympanic membrane normal.      Left Ear: Tympanic membrane normal.      Nose: Nose normal.      Mouth/Throat:       Mouth: Mucous membranes are moist.   Eyes:      Conjunctiva/sclera: Conjunctivae normal.      Pupils: Pupils are equal, round, and reactive to light.   Neck:      Vascular: No carotid bruit.   Cardiovascular:      Rate and Rhythm: Normal rate and regular rhythm.      Pulses: Normal pulses.      Heart sounds: Normal heart sounds. No murmur heard.     No gallop.   Pulmonary:      Effort: Pulmonary effort is normal.      Breath sounds: Normal breath sounds.   Abdominal:      General: Bowel sounds are normal.      Palpations: Abdomen is soft.      Tenderness: There is no abdominal tenderness.   Musculoskeletal:         General: Normal range of motion.      Cervical back: Normal range of motion.      Right lower leg: No edema.      Left lower leg: No edema.   Lymphadenopathy:      Cervical: No cervical adenopathy.   Skin:     General: Skin is warm and dry.   Neurological:      General: No focal deficit present.      Mental Status: She is alert and oriented to person, place, and time.   Psychiatric:         Behavior: Behavior normal.         Thought Content: Thought content normal.         Judgment: Judgment normal.         Assessment:       1. Physical exam    2. Need for vaccination    3. Hypertension, essential    4. Fatigue, unspecified type    5. Major depressive disorder with current active episode, unspecified depression episode severity, unspecified whether recurrent    6. Migraine without aura and without status migrainosus, not intractable 5/2020 neuro - topamax    7. Counseling for estrogen replacement therapy    8. BMI 35.0-35.9,adult    9. History of repair of dehiscence of vaginal cuff    10. History of breast augmentation    11. S/P complete hysterectomy    12. Gestational diabetes mellitus (GDM), antepartum, gestational diabetes method of control unspecified    13. Hormone replacement therapy    14. Pre-eclampsia, antepartum    15. Cystocele with rectocele        Plan:       Physical exam  -     CBC Auto  Differential; Future; Expected date: 11/11/2024  -     Comprehensive Metabolic Panel; Future; Expected date: 11/11/2024  -     Lipid Panel; Future; Expected date: 11/11/2024  -     Hemoglobin A1C; Future; Expected date: 11/11/2024  -     TSH; Future; Expected date: 11/11/2024  -     T4, Free; Future; Expected date: 11/11/2024  -     Vitamin B12; Future; Expected date: 11/11/2024    Need for vaccination  -     influenza (Flulaval, Fluzone, Fluarix) 45 mcg/0.5 mL IM vaccine (> or = 6 mo) 0.5 mL    Hypertension, essential    Fatigue, unspecified type  -     Home Sleep Study; Future    Major depressive disorder with current active episode, unspecified depression episode severity, unspecified whether recurrent  -     Ambulatory referral/consult to Psychiatry; Future; Expected date: 11/18/2024    Migraine without aura and without status migrainosus, not intractable 5/2020 neuro - topamax    Counseling for estrogen replacement therapy    BMI 35.0-35.9,adult    History of repair of dehiscence of vaginal cuff    History of breast augmentation    S/P complete hysterectomy    Gestational diabetes mellitus (GDM), antepartum, gestational diabetes method of control unspecified    Hormone replacement therapy    Pre-eclampsia, antepartum    Cystocele with rectocele    Other orders  -     lamoTRIgine (LAMICTAL) 25 MG tablet; Take 1 tablet (25 mg total) by mouth 2 (two) times daily.  Dispense: 60 tablet; Refill: 0    CBC CMP lipids A1c free T4 TSH B12.    In addition to routine physical.  Problems with depression.  Literally has tried 17 different medicines over the past couple of years.  Adverse reactions to all.  Her current psychiatrist is no longer seeing hers gone off of her insurance.  Needs new 1.  Does not think that she has tried Lamictal.  Snoring and fatigue.    Physical examination.  Vital signs noted.  Mood and affect normal.  Neck without bruit supple.  Chest clear.  Heart regular rate rhythm.    Impression.  Major  depression.     Plan Continue Abilify for now.  Add Lamictal 25 b.i.d..  Sixty pills.  Refer to Psychiatry as soon as possible.  Or [facility] [place]  Follow-up in 3 weeks.

## 2024-12-17 RX ORDER — LAMOTRIGINE 25 MG/1
25 TABLET ORAL 2 TIMES DAILY
Qty: 60 TABLET | Refills: 0 | Status: SHIPPED | OUTPATIENT
Start: 2024-12-17

## 2024-12-17 NOTE — TELEPHONE ENCOUNTER
Refill Routing Note   Medication(s) are not appropriate for processing by [facility] for the following reason(s):        Outside of protocol    [facility] action(s):  Route               Appointments  past 12m or future 3m with PCP    Date Provider   Last Visit   11/11/2024 Miguel Carpenter III, MD   Next Visit   1/3/2025 Miguel Carpenter III, MD   ED visits in past 90 days: 0        Note composed:6:25 AM 12/17/2024

## 2025-01-03 ENCOUNTER — OFFICE VISIT (OUTPATIENT)
Dept: FAMILY MEDICINE | Facility: CLINIC | Age: 35
End: 2025-01-03
Payer: COMMERCIAL

## 2025-01-03 ENCOUNTER — LAB VISIT (OUTPATIENT)
Dept: LAB | Facility: HOSPITAL | Age: 35
End: 2025-01-03
Attending: FAMILY MEDICINE
Payer: COMMERCIAL

## 2025-01-03 VITALS
TEMPERATURE: 99 F | HEIGHT: 63 IN | DIASTOLIC BLOOD PRESSURE: 82 MMHG | HEART RATE: 76 BPM | SYSTOLIC BLOOD PRESSURE: 120 MMHG | BODY MASS INDEX: 39.77 KG/M2 | OXYGEN SATURATION: 98 % | WEIGHT: 224.44 LBS

## 2025-01-03 DIAGNOSIS — F32.A DEPRESSION, UNSPECIFIED DEPRESSION TYPE: ICD-10-CM

## 2025-01-03 DIAGNOSIS — Z13.31 POSITIVE DEPRESSION SCREENING: ICD-10-CM

## 2025-01-03 DIAGNOSIS — I10 HYPERTENSION, ESSENTIAL: Primary | ICD-10-CM

## 2025-01-03 DIAGNOSIS — Z00.00 PHYSICAL EXAM: ICD-10-CM

## 2025-01-03 DIAGNOSIS — F41.9 ANXIETY: ICD-10-CM

## 2025-01-03 LAB
ALBUMIN SERPL BCP-MCNC: 4.5 G/DL (ref 3.5–5.2)
ALP SERPL-CCNC: 67 U/L (ref 55–135)
ALT SERPL W/O P-5'-P-CCNC: 16 U/L (ref 10–44)
ANION GAP SERPL CALC-SCNC: 4 MMOL/L (ref 8–16)
AST SERPL-CCNC: 16 U/L (ref 10–40)
BASOPHILS # BLD AUTO: 0.03 K/UL (ref 0–0.2)
BASOPHILS NFR BLD: 0.6 % (ref 0–1.9)
BILIRUB SERPL-MCNC: 0.3 MG/DL (ref 0.1–1)
BUN SERPL-MCNC: 15 MG/DL (ref 6–20)
CALCIUM SERPL-MCNC: 9.1 MG/DL (ref 8.7–10.5)
CHLORIDE SERPL-SCNC: 108 MMOL/L (ref 95–110)
CHOLEST SERPL-MCNC: 199 MG/DL (ref 120–199)
CHOLEST/HDLC SERPL: 3.4 {RATIO} (ref 2–5)
CO2 SERPL-SCNC: 29 MMOL/L (ref 23–29)
CREAT SERPL-MCNC: 0.7 MG/DL (ref 0.5–1.4)
DIFFERENTIAL METHOD BLD: ABNORMAL
EOSINOPHIL # BLD AUTO: 0.3 K/UL (ref 0–0.5)
EOSINOPHIL NFR BLD: 6.4 % (ref 0–8)
ERYTHROCYTE [DISTWIDTH] IN BLOOD BY AUTOMATED COUNT: 12.8 % (ref 11.5–14.5)
EST. GFR  (NO RACE VARIABLE): >60 ML/MIN/1.73 M^2
ESTIMATED AVG GLUCOSE: 100 MG/DL (ref 68–131)
GLUCOSE SERPL-MCNC: 88 MG/DL (ref 70–110)
HBA1C MFR BLD: 5.1 % (ref 4.5–6.2)
HCT VFR BLD AUTO: 39.7 % (ref 37–48.5)
HDLC SERPL-MCNC: 58 MG/DL (ref 40–75)
HDLC SERPL: 29.1 % (ref 20–50)
HGB BLD-MCNC: 12.6 G/DL (ref 12–16)
IMM GRANULOCYTES # BLD AUTO: 0.01 K/UL (ref 0–0.04)
IMM GRANULOCYTES NFR BLD AUTO: 0.2 % (ref 0–0.5)
LDLC SERPL CALC-MCNC: 119.4 MG/DL (ref 63–159)
LYMPHOCYTES # BLD AUTO: 1.8 K/UL (ref 1–4.8)
LYMPHOCYTES NFR BLD: 35 % (ref 18–48)
MCH RBC QN AUTO: 31.4 PG (ref 27–31)
MCHC RBC AUTO-ENTMCNC: 31.7 G/DL (ref 32–36)
MCV RBC AUTO: 99 FL (ref 82–98)
MONOCYTES # BLD AUTO: 0.4 K/UL (ref 0.3–1)
MONOCYTES NFR BLD: 7.5 % (ref 4–15)
NEUTROPHILS # BLD AUTO: 2.6 K/UL (ref 1.8–7.7)
NEUTROPHILS NFR BLD: 50.3 % (ref 38–73)
NONHDLC SERPL-MCNC: 141 MG/DL
NRBC BLD-RTO: 0 /100 WBC
PLATELET # BLD AUTO: 265 K/UL (ref 150–450)
PMV BLD AUTO: 10.5 FL (ref 9.2–12.9)
POTASSIUM SERPL-SCNC: 4.3 MMOL/L (ref 3.5–5.1)
PROT SERPL-MCNC: 6.9 G/DL (ref 6–8.4)
RBC # BLD AUTO: 4.01 M/UL (ref 4–5.4)
SODIUM SERPL-SCNC: 141 MMOL/L (ref 136–145)
T4 FREE SERPL-MCNC: 0.53 NG/DL (ref 0.71–1.51)
TRIGL SERPL-MCNC: 108 MG/DL (ref 30–150)
TSH SERPL DL<=0.005 MIU/L-ACNC: 1 UIU/ML (ref 0.34–5.6)
VIT B12 SERPL-MCNC: 1056 PG/ML (ref 210–950)
WBC # BLD AUTO: 5.17 K/UL (ref 3.9–12.7)

## 2025-01-03 PROCEDURE — 85025 COMPLETE CBC W/AUTO DIFF WBC: CPT | Performed by: FAMILY MEDICINE

## 2025-01-03 PROCEDURE — 1160F RVW MEDS BY RX/DR IN RCRD: CPT | Mod: CPTII,S$GLB,, | Performed by: FAMILY MEDICINE

## 2025-01-03 PROCEDURE — 84439 ASSAY OF FREE THYROXINE: CPT | Performed by: FAMILY MEDICINE

## 2025-01-03 PROCEDURE — 99999 PR PBB SHADOW E&M-EST. PATIENT-LVL IV: CPT | Mod: PBBFAC,,, | Performed by: FAMILY MEDICINE

## 2025-01-03 PROCEDURE — 83036 HEMOGLOBIN GLYCOSYLATED A1C: CPT | Performed by: FAMILY MEDICINE

## 2025-01-03 PROCEDURE — 84443 ASSAY THYROID STIM HORMONE: CPT | Performed by: FAMILY MEDICINE

## 2025-01-03 PROCEDURE — 3044F HG A1C LEVEL LT 7.0%: CPT | Mod: CPTII,S$GLB,, | Performed by: FAMILY MEDICINE

## 2025-01-03 PROCEDURE — 3008F BODY MASS INDEX DOCD: CPT | Mod: CPTII,S$GLB,, | Performed by: FAMILY MEDICINE

## 2025-01-03 PROCEDURE — 99214 OFFICE O/P EST MOD 30 MIN: CPT | Mod: S$GLB,,, | Performed by: FAMILY MEDICINE

## 2025-01-03 PROCEDURE — 80061 LIPID PANEL: CPT | Performed by: FAMILY MEDICINE

## 2025-01-03 PROCEDURE — 3079F DIAST BP 80-89 MM HG: CPT | Mod: CPTII,S$GLB,, | Performed by: FAMILY MEDICINE

## 2025-01-03 PROCEDURE — 1159F MED LIST DOCD IN RCRD: CPT | Mod: CPTII,S$GLB,, | Performed by: FAMILY MEDICINE

## 2025-01-03 PROCEDURE — 80053 COMPREHEN METABOLIC PANEL: CPT | Performed by: FAMILY MEDICINE

## 2025-01-03 PROCEDURE — 3074F SYST BP LT 130 MM HG: CPT | Mod: CPTII,S$GLB,, | Performed by: FAMILY MEDICINE

## 2025-01-03 PROCEDURE — 82607 VITAMIN B-12: CPT | Performed by: FAMILY MEDICINE

## 2025-01-03 RX ORDER — ARIPIPRAZOLE 10 MG/1
10 TABLET ORAL DAILY
Qty: 90 TABLET | Refills: 1 | Status: SHIPPED | OUTPATIENT
Start: 2025-01-03

## 2025-01-03 RX ORDER — VORTIOXETINE 20 MG/1
1 TABLET, FILM COATED ORAL DAILY
Qty: 90 TABLET | Refills: 1 | Status: SHIPPED | OUTPATIENT
Start: 2025-01-03

## 2025-01-03 RX ORDER — PHENTERMINE HYDROCHLORIDE 37.5 MG/1
37.5 TABLET ORAL
Qty: 30 TABLET | Refills: 0 | Status: SHIPPED | OUTPATIENT
Start: 2025-01-03 | End: 2025-02-02

## 2025-01-03 RX ORDER — LAMOTRIGINE 25 MG/1
25 TABLET ORAL 2 TIMES DAILY
Qty: 60 TABLET | Refills: 2 | Status: SHIPPED | OUTPATIENT
Start: 2025-01-03 | End: 2025-01-04

## 2025-01-04 PROBLEM — F32.A DEPRESSION: Status: ACTIVE | Noted: 2018-10-12

## 2025-01-04 PROBLEM — F41.9 ANXIETY: Status: ACTIVE | Noted: 2025-01-04

## 2025-01-05 NOTE — PROGRESS NOTES
Subjective:       Patient ID: Marly Mayfield is a 34 y.o. female.    Chief Complaint: Follow-up (Psych consult)    BMI of 39.7.  Hypertension controlled.  Did not do her labs.  She would like Adipex to help with weight.  Took it about 4 years ago last.   check done.  Depression anxiety.  Doing fairly well on her medications.  Needs refills.  Needs psychiatry follow-up.  Did not hear back from [facility].    Physical examination.  Vital signs noted.  No acute distress.  Neck without bruit.  Chest clear.  Heart regular rate and rhythm.  Extremities without edema.  Mood and affect normal.        Objective:        Assessment:       1. Hypertension, essential    2. Depression, unspecified depression type    3. Anxiety    4. BMI 39.0-39.9,adult    5. Positive depression screening        Plan:       Hypertension, essential    Depression, unspecified depression type  -     Ambulatory referral/consult to Psychiatry; Future; Expected date: 01/10/2025    Anxiety  -     Ambulatory referral/consult to Psychiatry; Future; Expected date: 01/10/2025    BMI 39.0-39.9,adult  -     phentermine (ADIPEX-P) 37.5 mg tablet; Take 1 tablet (37.5 mg total) by mouth before breakfast.  Dispense: 30 tablet; Refill: 0    Positive depression screening    Other orders  -     TRINTELLIX 20 mg Tab; Take 1 tablet (20 mg total) by mouth once daily.  Dispense: 90 tablet; Refill: 1  -     lamoTRIgine (LAMICTAL) 25 MG tablet; Take 1 tablet (25 mg total) by mouth 2 (two) times daily.  Dispense: 60 tablet; Refill: 2  -     ARIPiprazole (ABILIFY) 10 MG Tab; Take 1 tablet (10 mg total) by mouth once daily.  Dispense: 90 tablet; Refill: 1    Go for her labs.  Low-fat diet discussed in great detail.  Extensive dietary counseling.  Adipex P 30 7.5 mg 30 pills 1 daily.  Follow-up on this in one-month for refill.  Two [facility] for psychiatric care also will refer her to Psychiatry upstairs.  See which 1 May have an opening.  Refill her Trintellix  and her Lamictal and Abilify.  No evidence of tardive dyskinesia.

## 2025-02-20 ENCOUNTER — OFFICE VISIT (OUTPATIENT)
Dept: PSYCHIATRY | Facility: CLINIC | Age: 35
End: 2025-02-20
Payer: COMMERCIAL

## 2025-02-20 VITALS
HEART RATE: 86 BPM | DIASTOLIC BLOOD PRESSURE: 87 MMHG | BODY MASS INDEX: 38.57 KG/M2 | WEIGHT: 217.69 LBS | HEIGHT: 63 IN | SYSTOLIC BLOOD PRESSURE: 129 MMHG

## 2025-02-20 DIAGNOSIS — F31.81 BIPOLAR II DISORDER: Primary | ICD-10-CM

## 2025-02-20 DIAGNOSIS — F41.9 ANXIETY: ICD-10-CM

## 2025-02-20 DIAGNOSIS — F32.A DEPRESSION, UNSPECIFIED DEPRESSION TYPE: ICD-10-CM

## 2025-02-20 RX ORDER — ARIPIPRAZOLE 15 MG/1
15 TABLET ORAL DAILY
Qty: 30 TABLET | Refills: 1 | Status: SHIPPED | OUTPATIENT
Start: 2025-02-20 | End: 2026-02-20

## 2025-02-20 RX ORDER — LAMOTRIGINE 100 MG/1
100 TABLET ORAL DAILY
Qty: 30 TABLET | Refills: 1 | Status: SHIPPED | OUTPATIENT
Start: 2025-02-20 | End: 2026-02-20

## 2025-02-20 RX ORDER — LAMOTRIGINE 25 MG/1
25 TABLET ORAL 2 TIMES DAILY
COMMUNITY
End: 2025-02-20

## 2025-02-20 RX ORDER — ARIPIPRAZOLE 10 MG/1
10 TABLET ORAL ONCE
COMMUNITY
End: 2025-02-20 | Stop reason: SDUPTHER
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
## 2025-02-20 NOTE — PATIENT INSTRUCTIONS
Continue Trintellix 20mg once daily.    Increase Lamictal dosage to 100mg once daily.    Increase Abilify dosage to 15mg once daily

## 2025-02-20 NOTE — LETTER
February 20, 2025        Miguel Carpenter III, MD  [address]  [address]  [place] [address]             [facility]  [address]  [place] [address]  Phone: [phone]   Patient: Marly Mayfield   MR Number: [id number]   YOB: 1990   Date of Visit: 2/20/2025       Dear Dr. Carpenter:    Thank you for referring Marly Mayfield to me for evaluation. Below are the relevant portions of my assessment and plan of care.    Pt is to continue Trintellix 20mg once daily. Pt will increase Abilify dosage to 15mg Q D and increase Lamictal dosage to 100mg Q D.    If you have questions, please do not hesitate to call me. I look forward to following Marly VALLES along with you.    Sincerely,      Damian Aleman, PhD, MP           CC  No Recipients

## 2025-02-27 ENCOUNTER — PATIENT MESSAGE (OUTPATIENT)
Dept: PSYCHIATRY | Facility: CLINIC | Age: 35
End: 2025-02-27
Payer: COMMERCIAL

## 2025-03-20 ENCOUNTER — OFFICE VISIT (OUTPATIENT)
Dept: PSYCHIATRY | Facility: CLINIC | Age: 35
End: 2025-03-20
Payer: COMMERCIAL

## 2025-03-20 VITALS
HEIGHT: 63 IN | BODY MASS INDEX: 38.23 KG/M2 | WEIGHT: 215.75 LBS | DIASTOLIC BLOOD PRESSURE: 89 MMHG | SYSTOLIC BLOOD PRESSURE: 131 MMHG | HEART RATE: 79 BPM

## 2025-03-20 DIAGNOSIS — F32.A DEPRESSION, UNSPECIFIED DEPRESSION TYPE: ICD-10-CM

## 2025-03-20 DIAGNOSIS — F31.81 BIPOLAR II DISORDER: Primary | ICD-10-CM

## 2025-03-20 PROCEDURE — 99999 PR PBB SHADOW E&M-EST. PATIENT-LVL III: CPT | Mod: PBBFAC,,, | Performed by: PSYCHOLOGIST

## 2025-03-20 RX ORDER — VORTIOXETINE 20 MG/1
1 TABLET, FILM COATED ORAL DAILY
Qty: 90 TABLET | Refills: 1 | Status: SHIPPED | OUTPATIENT
Start: 2025-03-20

## 2025-03-20 RX ORDER — ARIPIPRAZOLE 20 MG/1
20 TABLET ORAL DAILY
Qty: 30 TABLET | Refills: 1 | Status: SHIPPED | OUTPATIENT
Start: 2025-03-20 | End: 2026-03-20

## 2025-03-20 RX ORDER — LAMOTRIGINE 150 MG/1
150 TABLET ORAL DAILY
Qty: 30 TABLET | Refills: 1 | Status: SHIPPED | OUTPATIENT
Start: 2025-03-20 | End: 2026-03-20

## 2025-03-20 NOTE — PROGRESS NOTES
Outpatient Psychiatry Follow-Up Visit    Clinical Status of Patient: Outpatient (Ambulatory)  03/20/2025     Chief Complaint: 35 year old female presenting today for a follow-up.       Interval History and Content of Current Session:  Interim Events/Subjective Report/Content of Current Session:  follow-up appointment.    Pt is a 35 year old female with past psychiatric hx of bipolar disorder and polysubstance abuse who presents for follow-up treatment. Pt is not feeling much better or worse. Pt feels angry a lot and also expressed that she is using her sister's stimulant medication daily. Pt is open to a non-stimulant but will regulate mood first. Pt agreed to counseling. Pt is tired all day and going to follow-up with a new PCP soon. Pt denied any other major changes or new stressors.    Past Psychiatric hx: Valium, Elavil, Wellbutrin, Celexa, Trazodone, Buspar, Prozac, Lexapro, Zoloft, Seroquel, Zyprexa, Ritalin, Adderall, Latuda, Vraylar (sedating), Viibryd, Latuda     Past Medical hx:   Past Medical History:   Diagnosis Date    Addiction to drug     THCA as a teenager, last smoked 8 years ago    Anxiety     Bipolar disorder     Bronchitis, chronic     Hallucination     Headache     History of repair of dehiscence of vaginal cuff 11/12/2024    Hx of psychiatric care     Hypertension, essential 11/12/2024    Cait     Psychiatric problem     Psychosis     Sleep difficulties     Substance abuse     Suicide attempt     Therapy         Interim hx:  Medication changes last visit:   increased Lamictal dosage to 100mg Q D and increased Abilify dosage to 15mg Q D  Anxiety: moderate - variable  Depression: moderate -variable     Denies suicidal/homicidal ideations.  Denies hopelessness/worthlessness.    Denies auditory/visual hallucinations      Alcohol: pt denied  Drug: THC nightly and using non-prescription Adderall  Caffeine: moderate use  Tobacco: vapes daily      Review of Systems   PSYCHIATRIC: Pertinent items are  noted in the narrative.        CONSTITUTIONAL: weight stable    Past Medical, Family and Social History: The patient's past medical, family and social history have been reviewed and updated as appropriate within the electronic medical record. See encounter notes.     Current Psychiatric Medication:  Trintellix 20mg once daily, Abilify 15mg Q D, and Lamictal 100mg Q D     Compliance: yes      Side effects: pt denied     Risk Parameters:  Patient reports no suicidal ideation  Patient reports no homicidal ideation  Patient reports no self-injurious behavior  Patient reports no violent behavior     Exam (detailed: at least 9 elements; comprehensive: all 15 elements)   Constitutional  Vitals:  Most recent vital signs, dated less than 90 days prior to this appointment, were reviewed. Pulse:  [79]   BP: (131)/(89)       General:  unremarkable, age appropriate, casual attire, good eye contact, good rapport       Musculoskeletal  Muscle Strength/Tone:  no flaccidity, no tremor    Gait & Station:  normal      Psychiatric                       Speech:  normal tone, normal rate, rhythm, and volume   Mood & Affect:   Depressed, anxious         Thought Process:   Goal directed; Linear    Associations:   intact   Thought Content:   No SI/HI, delusions, or paranoia, no AV/VH   Insight & Judgement:   Good, adequate to circumstances   Orientation:   grossly intact; alert and oriented x 4    Memory:  intact for content of interview    Language:  grossly intact, can repeat    Attention Span  : Grossly intact for content of interview   Fund of Knowledge:   intact and appropriate to age and level of education        Assessment and Diagnosis   Status/Progress: unchanged     Impression: Pt appears to struggled with bipolar disorder that is not well managed with her current medication. Pt also has a history of polysubstance abuse and a long trauma history. Pt should continue in psychiatric medication management, start psychotherapy, and  start a cardio exercise regimen.     Diagnosis(es):   1) Bipolar II disorder    Intervention/Counseling/Treatment Plan   Medication Management:      1. Continue Trintellix 20mg once daily     2. Increase Abilify dosage to 20mg Q D     3. Increase Lamictal dosage to 150mg Q D    4. Start psychotherapy     5. Call to report any worsening of symptoms or problems with the medication. Pt instructed to go to ER with thoughts of harming self, others    Psychotherapy:   Target symptoms: mood lability  Why chosen therapy is appropriate versus another modality: CBT used; relevant to diagnosis, patient responds to this modality  Outcome monitoring methods: self-report, observation  Therapeutic intervention type: Cognitive Behavioral Therapy  Topics discussed/themes: building skills sets for symptom management, symptom recognition, nutrition, exercise  The patient's response to the intervention is fair  Patient's response to treatment is: good.   The patient's progress toward treatment goals: unchanged  16 minutes spent with pt in psychotherapy and 5 minutes in medication management     Return to clinic: 4 weeks or earlier PRN    -Cognitive-Behavioral/Supportive therapy and psychoeducation provided  -R/B/SE's of medications discussed with the pt who expresses understanding and chooses to take medications as prescribed.   -Pt instructed to call clinic, 911 or go to nearest emergency room if sxs worsen or pt is in   crisis. The pt expresses understanding.    Damian Aleman, PhD, MP     Visit today included increased complexity associated with the care of the episodic problem associated with mental health. Addressed and managed the longitudinal care of the patient due to the serious and/or complex mental health diagnosis.

## 2025-03-26 ENCOUNTER — PATIENT MESSAGE (OUTPATIENT)
Dept: PSYCHIATRY | Facility: CLINIC | Age: 35
End: 2025-03-26
Payer: COMMERCIAL

## 2025-04-02 ENCOUNTER — HOSPITAL ENCOUNTER (EMERGENCY)
Facility: HOSPITAL | Age: 35
Discharge: HOME | End: 2025-04-02
Attending: FAMILY MEDICINE
Payer: COMMERCIAL

## 2025-04-02 VITALS
BODY MASS INDEX: 35.43 KG/M2 | OXYGEN SATURATION: 100 % | DIASTOLIC BLOOD PRESSURE: 74 MMHG | HEART RATE: 75 BPM | SYSTOLIC BLOOD PRESSURE: 159 MMHG | RESPIRATION RATE: 18 BRPM | TEMPERATURE: 99 F | WEIGHT: 200 LBS

## 2025-04-02 DIAGNOSIS — R60.9 SWELLING: ICD-10-CM

## 2025-04-02 DIAGNOSIS — M25.531 RIGHT WRIST PAIN: Primary | ICD-10-CM

## 2025-04-02 PROCEDURE — 63600175 PHARM REV CODE 636 W HCPCS

## 2025-04-02 PROCEDURE — 99285 EMERGENCY DEPT VISIT HI MDM: CPT | Mod: 25

## 2025-04-02 PROCEDURE — 29125 APPL SHORT ARM SPLINT STATIC: CPT | Mod: RT

## 2025-04-02 PROCEDURE — 96372 THER/PROPH/DIAG INJ SC/IM: CPT

## 2025-04-02 PROCEDURE — 25000003 PHARM REV CODE 250

## 2025-04-02 RX ORDER — DEXAMETHASONE SODIUM PHOSPHATE 4 MG/ML
8 INJECTION, SOLUTION INTRA-ARTICULAR; INTRALESIONAL; INTRAMUSCULAR; INTRAVENOUS; SOFT TISSUE
Status: COMPLETED | OUTPATIENT
Start: 2025-04-02 | End: 2025-04-02

## 2025-04-02 RX ORDER — PREDNISONE 20 MG/1
20 TABLET ORAL DAILY
Qty: 3 TABLET | Refills: 0 | Status: SHIPPED | OUTPATIENT
Start: 2025-04-02 | End: 2025-04-05

## 2025-04-02 RX ORDER — NAPROXEN 500 MG/1
500 TABLET ORAL 2 TIMES DAILY WITH MEALS
Qty: 30 TABLET | Refills: 0 | Status: SHIPPED | OUTPATIENT
Start: 2025-04-02 | End: 2025-04-17

## 2025-04-02 RX ORDER — DEXAMETHASONE SODIUM PHOSPHATE 4 MG/ML
8 INJECTION, SOLUTION INTRA-ARTICULAR; INTRALESIONAL; INTRAMUSCULAR; INTRAVENOUS; SOFT TISSUE
Status: DISCONTINUED | OUTPATIENT
Start: 2025-04-02 | End: 2025-04-02

## 2025-04-02 RX ADMIN — DEXAMETHASONE SODIUM PHOSPHATE 8 MG: 4 INJECTION, SOLUTION INTRA-ARTICULAR; INTRALESIONAL; INTRAMUSCULAR; INTRAVENOUS; SOFT TISSUE at 08:04

## 2025-04-02 RX ADMIN — IBUPROFEN 600 MG: 200 TABLET, FILM COATED ORAL at 08:04

## 2025-04-03 NOTE — ED PROVIDER NOTES
"Encounter Date: 4/2/2025       History     Chief Complaint   Patient presents with    right hand swelling     Started 2 days ago. " Feels like fire radiating down to the right hand      35-year-old female past medical history of hypertension, bipolar disorder presents to the emergency department for right hand swelling x2 days.  Patient reports she was sleeping when she woke up due to right arm pain.  Majority of pain is localized to the wrist and dorsum of the hand.  She does not recall any trauma.  She denies any fever at home.  Reports that it is difficult for her to bend her fingers.  She has not tried anything for pain.  She has no personal or family history of blood clots.  She is currently on hormone replacement therapy.  No IV drug use.        Review of patient's allergies indicates:   Allergen Reactions    Sulfa (sulfonamide antibiotics) Anaphylaxis and Swelling    Sertraline      Other reaction(s): Other (See Comments)  MAKES "ME GO CRAZY"     Past Medical History:   Diagnosis Date    Addiction to drug     THCA as a teenager, last smoked 8 years ago    Anxiety     Bipolar disorder     Bronchitis, chronic     Hallucination     Headache     History of repair of dehiscence of vaginal cuff 11/12/2024    Hx of psychiatric care     Hypertension, essential 11/12/2024    Cait     Psychiatric problem     Psychosis     Sleep difficulties     Substance abuse     Suicide attempt     Therapy      Past Surgical History:   Procedure Laterality Date    BREAST SURGERY      DILATION AND CURETTAGE OF UTERUS  2019    HYSTERECTOMY  03/01/2023    Dr. Juan Labadie and Kim Tran    REPAIR OF VAGINAL CUFF  03/22/2023    VAGINAL HYSTERECTOMY W/ ANTERIOR AND POSTERIOR VAGINAL REPAIR  03/01/2023    Drs. J Labadie and Kim Tran     Family History   Problem Relation Name Age of Onset    Hypertension Mother      Heart disease Mother      Heart failure Mother      Liver disease Father      Bipolar disorder Father      Schizophrenia Father "      Alcohol abuse Father      Drug abuse Father      Anxiety disorder Paternal Grandfather      Dementia Paternal Grandfather      Breast cancer Neg Hx      Colon cancer Neg Hx      Ovarian cancer Neg Hx       Social History[1]  Review of Systems   Constitutional: Negative.    Respiratory: Negative.     Cardiovascular: Negative.    Musculoskeletal:  Positive for arthralgias and myalgias.       Physical Exam     Initial Vitals [04/02/25 1854]   BP Pulse Resp Temp SpO2   (!) 162/91 80 14 98.9 °F (37.2 °C) 100 %      MAP       --         Physical Exam    Vitals reviewed.  Constitutional: She appears well-developed and well-nourished. She is not diaphoretic. No distress.   HENT:   Head: Atraumatic.   Right Ear: External ear normal.   Left Ear: External ear normal. Mouth/Throat: Oropharynx is clear and moist.   Eyes: Conjunctivae and EOM are normal. Pupils are equal, round, and reactive to light. Right eye exhibits no discharge. Left eye exhibits no discharge.   Neck:   Normal range of motion.  Cardiovascular:  Normal rate, regular rhythm, normal heart sounds and intact distal pulses.           Pulmonary/Chest: Breath sounds normal. No respiratory distress.   Abdominal: Abdomen is soft.   Musculoskeletal:         General: Tenderness (Tenderness noted to the dorsum of the hand and the fingers) and edema (Minimal edema noted to the dorsum of the hand) present.        Arms:       Cervical back: Normal range of motion.      Comments: Patient unable to  my hand due to pain with bending the fingers.  Painful in limited active range of motion of the wrist but full passive range of motion.  No increased warmth, erythema, ecchymosis, signs of infection.  No isolated midline tenderness.     Neurological: She is alert and oriented to person, place, and time. She has normal strength. No sensory deficit (Sensation intact). GCS score is 15. GCS eye subscore is 4. GCS verbal subscore is 5. GCS motor subscore is 6.   Skin: Skin is  warm. Capillary refill takes less than 2 seconds.         ED Course   Procedures  Labs Reviewed - No data to display       Imaging Results              X-Ray Wrist Complete Right (Final result)  Result time 04/02/25 21:34:46      Final result by Marychuy Landon MD (04/02/25 21:34:46)                   Impression:      Mild wrist swelling.      Electronically signed by: Marychuy Landon  Date:    04/02/2025  Time:    21:34               Narrative:    EXAMINATION:  XR WRIST COMPLETE 3 VIEWS RIGHT    CLINICAL HISTORY:  Edema, unspecified    TECHNIQUE:  PA, lateral, and oblique views of the right wrist were performed.    COMPARISON:  None    FINDINGS:  No acute fracture or dislocation.  Joint spaces are maintained.  Mild wrist swelling.                                       X-Ray Hand 3 View Right (Final result)  Result time 04/02/25 21:19:44      Final result by Marychuy Landon MD (04/02/25 21:19:44)                   Impression:      Swelling.      Electronically signed by: Marychuy Landon  Date:    04/02/2025  Time:    21:19               Narrative:    EXAMINATION:  XR HAND COMPLETE 3 VIEW RIGHT    CLINICAL HISTORY:  Swelling;    TECHNIQUE:  PA, lateral, and oblique views of the right hand were performed.    COMPARISON:  None    FINDINGS:  No acute fracture, dislocation, or traumatic malalignment.  Joint spaces are maintained.  Soft tissue swelling.                                       US Upper Extremity Veins Right (Final result)  Result time 04/02/25 19:49:32      Final result by Marychuy Landon MD (04/02/25 19:49:32)                   Impression:      No thrombus in central veins of the right upper extremity.    Incompletely evaluated fluid collection associated with the soft tissues of the wrist.  If clinically warranted, recommend follow-up MRI with and without contrast.      Electronically signed by: Marychuy Landon  Date:    04/02/2025  Time:    19:49               Narrative:     EXAMINATION:  US UPPER EXTREMITY VEINS RIGHT    CLINICAL HISTORY:  Right arm swelling;    TECHNIQUE:  Duplex and color flow Doppler evaluation and dynamic compression was performed of the right upper extremity veins.    COMPARISON:  None    FINDINGS:  Central veins: The internal jugular, subclavian, and axillary veins are patent and free of thrombus.    Arm veins: The brachial, basilic, and cephalic veins are patent and compressible.    Other findings: Incompletely evaluated fluid collection associated with the soft tissues of the wrist.                                       Medications   ibuprofen tablet 600 mg (600 mg Oral Given 4/2/25 2020)   dexAMETHasone injection 8 mg (8 mg Intramuscular Given 4/2/25 2023)     Medical Decision Making  35-year-old female presents to the emergency department for right wrist pain that began 2 days ago    Considerations include but not limited to fracture, dislocation, cellulitis, fluid retention, DVT, carpal tunnel    Vitals stable.  Patient afebrile.  Well-appearing on physical exam.  Nontoxic and in no acute distress.  Very minimal swelling to the wrist and dorsum of the hand.  Patient has full range of motion against resistance with 5/5 strength and no sensory deficits.  No increased warmth or swelling over the joint.  No erythema.  Ultrasound is negative for thrombus.  X-ray show mild wrist swelling.  Patient will be given steroids as well as NSAIDs and placed in a splint.  She will be referred to Orthopedics.  She was given strict return precautions.  She verbalized her understanding of the plan and agreed.  Plan also discussed with my attending and all questions were answered at the bedside.    Amount and/or Complexity of Data Reviewed  Radiology: ordered.    Risk  Prescription drug management.                                      Clinical Impression:  Final diagnoses:  [R60.9] Swelling  [M25.531] Right wrist pain (Primary)          ED Disposition Condition    Discharge  Stable          ED Prescriptions       Medication Sig Dispense Start Date End Date Auth. Provider    predniSONE (DELTASONE) 20 MG tablet Take 1 tablet (20 mg total) by mouth once daily. for 3 days 3 tablet 2025 Missy Champagne PA-C    naproxen (NAPROSYN) 500 MG tablet Take 1 tablet (500 mg total) by mouth 2 (two) times daily with meals. for 15 days 30 tablet 2025 Missy Champagne PA-C          Follow-up Information       Follow up With Specialties Details Why Contact Info    Miguel Carpenter III, MD Family Medicine Call   [address]  [address]  [facility] [address]  [phone]      [facility] Orthopedic Surgery Call   [address]  [address]  [place] [facility] [address]  [phone]               Missy Champagne PA-C  [phone]         [1]   Social History  Tobacco Use    Smoking status: Former     Types: Vaping with nicotine, Cigarettes     Start date:      Quit date:      Years since quittin.2    Smokeless tobacco: Never   Substance Use Topics    Alcohol use: Yes     Alcohol/week: 0.8 standard drinks of alcohol     Types: 1 Standard drinks or equivalent per week     Comment: social    Drug use: Not Currently     Types: Marijuana     Comment: stopped 8 years ago at age 20        Missy Champagne PA-C  [phone]

## 2025-04-03 NOTE — DISCHARGE INSTRUCTIONS
Please take medications as prescribed.  Do not take naproxen with other NSAIDs such as Motrin.  You may also use Tylenol.  You may use the brace every night and during the day if tolerated.  Please follow up with Orthopedics should this pain persist.  I have attached her phone number to your discharge paperwork.  Return to the emergency department if you develop fever or worsening swelling with the pain.

## 2025-04-22 ENCOUNTER — OFFICE VISIT (OUTPATIENT)
Dept: PSYCHIATRY | Facility: CLINIC | Age: 35
End: 2025-04-22
Payer: COMMERCIAL

## 2025-04-22 VITALS
DIASTOLIC BLOOD PRESSURE: 88 MMHG | HEIGHT: 63 IN | WEIGHT: 218.13 LBS | SYSTOLIC BLOOD PRESSURE: 137 MMHG | HEART RATE: 80 BPM | BODY MASS INDEX: 38.65 KG/M2

## 2025-04-22 DIAGNOSIS — F31.81 BIPOLAR II DISORDER: Primary | ICD-10-CM

## 2025-04-22 DIAGNOSIS — F32.A DEPRESSION, UNSPECIFIED DEPRESSION TYPE: ICD-10-CM

## 2025-04-22 PROCEDURE — 99999 PR PBB SHADOW E&M-EST. PATIENT-LVL III: CPT | Mod: PBBFAC,,, | Performed by: PSYCHOLOGIST

## 2025-04-22 RX ORDER — ARIPIPRAZOLE 5 MG/1
TABLET ORAL
Qty: 30 TABLET | Refills: 2 | Status: SHIPPED | OUTPATIENT
Start: 2025-04-22

## 2025-04-22 RX ORDER — VORTIOXETINE 20 MG/1
1 TABLET, FILM COATED ORAL DAILY
Qty: 90 TABLET | Refills: 1 | Status: SHIPPED | OUTPATIENT
Start: 2025-04-22

## 2025-04-22 RX ORDER — LAMOTRIGINE 200 MG/1
200 TABLET ORAL DAILY
Qty: 30 TABLET | Refills: 2 | Status: SHIPPED | OUTPATIENT
Start: 2025-04-22 | End: 2026-04-22

## 2025-04-22 RX ORDER — ARIPIPRAZOLE 20 MG/1
TABLET ORAL
Qty: 30 TABLET | Refills: 1 | Status: SHIPPED | OUTPATIENT
Start: 2025-04-22

## 2025-04-22 NOTE — PROGRESS NOTES
Outpatient Psychiatry Follow-Up Visit    Clinical Status of Patient: Outpatient (Ambulatory)  04/22/2025     Chief Complaint: 35 year old female presenting today for a follow-up.       Interval History and Content of Current Session:  Interim Events/Subjective Report/Content of Current Session:  follow-up appointment.    Pt is a 35 year old female with past psychiatric hx of bipolar disorder and polysubstance abuse who presents for follow-up treatment with partner, Ignacio. Pt is feeling somewhat better. Pt said that she is somewhat less depressed and more apt to get out of the bed. Pt is still having disproportionate reactions to stressors but feels she has some pause where as before she would just explode. Pt feels more stable than before. AIMS completed today with no concerns. Pt denied any other major changes or new stressors.    Past Psychiatric hx: Valium, Elavil, Wellbutrin, Celexa, Trazodone, Buspar, Prozac, Lexapro, Zoloft, Seroquel, Zyprexa, Ritalin, Adderall, Latuda, Vraylar (sedating), Viibryd, Latuda     Past Medical hx:   Past Medical History:   Diagnosis Date    Addiction to drug     THCA as a teenager, last smoked 8 years ago    Anxiety     Bipolar disorder     Bronchitis, chronic     Hallucination     Headache     History of repair of dehiscence of vaginal cuff 11/12/2024    Hx of psychiatric care     Hypertension, essential 11/12/2024    Cait     Psychiatric problem     Psychosis     Sleep difficulties     Substance abuse     Suicide attempt     Therapy         Interim hx:  Medication changes last visit:   increased Lamictal dosage to 150mg Q D and increased Abilify dosage to 20mg Q D  Anxiety: moderate - variable  Depression: moderate -variable     Denies suicidal/homicidal ideations.  Denies hopelessness/worthlessness.    Denies auditory/visual hallucinations      Alcohol: pt denied  Drug: THC nightly and using non-prescription Adderall  Caffeine: moderate use  Tobacco: vapes daily      Review of  Systems   PSYCHIATRIC: Pertinent items are noted in the narrative.        CONSTITUTIONAL: weight stable    Past Medical, Family and Social History: The patient's past medical, family and social history have been reviewed and updated as appropriate within the electronic medical record. See encounter notes.     Current Psychiatric Medication:  Trintellix 20mg once daily, Abilify 20mg Q D, and Lamictal 150mg Q D     Compliance: yes      Side effects: pt denied     Risk Parameters:  Patient reports no suicidal ideation  Patient reports no homicidal ideation  Patient reports no self-injurious behavior  Patient reports no violent behavior     Exam (detailed: at least 9 elements; comprehensive: all 15 elements)   Constitutional  Vitals:  Most recent vital signs, dated less than 90 days prior to this appointment, were reviewed. Pulse:  [80]   BP: (137)/(88)       General:  unremarkable, age appropriate, casual attire, good eye contact, good rapport       Musculoskeletal  Muscle Strength/Tone:  no flaccidity, no tremor    Gait & Station:  normal      Psychiatric                       Speech:  normal tone, normal rate, rhythm, and volume   Mood & Affect:   Depressed, anxious         Thought Process:   Goal directed; Linear    Associations:   intact   Thought Content:   No SI/HI, delusions, or paranoia, no AV/VH   Insight & Judgement:   Good, adequate to circumstances   Orientation:   grossly intact; alert and oriented x 4    Memory:  intact for content of interview    Language:  grossly intact, can repeat    Attention Span  : Grossly intact for content of interview   Fund of Knowledge:   intact and appropriate to age and level of education        Assessment and Diagnosis   Status/Progress: unchanged     Impression: Pt appears to struggled with bipolar disorder that is not well managed with her current medication. Pt also has a history of polysubstance abuse and a long trauma history. Pt should continue in psychiatric  medication management, start psychotherapy, and start a cardio exercise regimen.     Diagnosis(es):   1) Bipolar II disorder    Intervention/Counseling/Treatment Plan   Medication Management:      1. Continue Trintellix 20mg once daily     2. Increase Abilify dosage to 25mg Q D     3. Increase Lamictal dosage to 200mg Q D    4. Start psychotherapy     5. Call to report any worsening of symptoms or problems with the medication. Pt instructed to go to ER with thoughts of harming self, others    Psychotherapy:   Target symptoms: mood lability  Why chosen therapy is appropriate versus another modality: CBT used; relevant to diagnosis, patient responds to this modality  Outcome monitoring methods: self-report, observation  Therapeutic intervention type: Cognitive Behavioral Therapy  Topics discussed/themes: building skills sets for symptom management, symptom recognition, nutrition, exercise  The patient's response to the intervention is fair  Patient's response to treatment is: good.   The patient's progress toward treatment goals: unchanged     Return to clinic: 2 months or earlier PRN    -Cognitive-Behavioral/Supportive therapy and psychoeducation provided  -R/B/SE's of medications discussed with the pt who expresses understanding and chooses to take medications as prescribed.   -Pt instructed to call clinic, 911 or go to nearest emergency room if sxs worsen or pt is in   crisis. The pt expresses understanding.    Damian Aleman, PhD, MP     Visit today included increased complexity associated with the care of the episodic problem associated with mental health. Addressed and managed the longitudinal care of the patient due to the serious and/or complex mental health diagnosis.

## 2025-06-12 ENCOUNTER — TELEPHONE (OUTPATIENT)
Dept: PSYCHIATRY | Facility: CLINIC | Age: 35
End: 2025-06-12
Payer: COMMERCIAL

## 2025-06-12 NOTE — TELEPHONE ENCOUNTER
"Patient called requesting to be seen sooner than 6/23/2025 for a follow up. States, " I just feel a little off and I think I need a medication increase or something." The last time I saw him I told him I was taking my sisters Adderall and he told me to stop doing that so I did." Im not sure if that's the reason why I'm feeling like this."    This author asked patient when was the last time she took her sisters Adderall. States, " the last time I saw Dr. Aleman." (4/22/2025)    NO SI/HI     Will keep patient on priority cancellation.  Radha .   "

## 2025-06-23 ENCOUNTER — TELEPHONE (OUTPATIENT)
Dept: PSYCHIATRY | Facility: CLINIC | Age: 35
End: 2025-06-23
Payer: COMMERCIAL

## 2025-06-23 ENCOUNTER — OFFICE VISIT (OUTPATIENT)
Dept: PSYCHIATRY | Facility: CLINIC | Age: 35
End: 2025-06-23
Payer: COMMERCIAL

## 2025-06-23 VITALS
HEART RATE: 93 BPM | WEIGHT: 215.75 LBS | HEIGHT: 63 IN | SYSTOLIC BLOOD PRESSURE: 125 MMHG | BODY MASS INDEX: 38.23 KG/M2 | DIASTOLIC BLOOD PRESSURE: 83 MMHG

## 2025-06-23 DIAGNOSIS — F31.81 BIPOLAR II DISORDER: Primary | ICD-10-CM

## 2025-06-23 DIAGNOSIS — F43.10 PTSD (POST-TRAUMATIC STRESS DISORDER): ICD-10-CM

## 2025-06-23 DIAGNOSIS — F32.A DEPRESSION, UNSPECIFIED DEPRESSION TYPE: ICD-10-CM

## 2025-06-23 PROCEDURE — 3044F HG A1C LEVEL LT 7.0%: CPT | Mod: CPTII,S$GLB,, | Performed by: PSYCHOLOGIST

## 2025-06-23 PROCEDURE — 3008F BODY MASS INDEX DOCD: CPT | Mod: CPTII,S$GLB,, | Performed by: PSYCHOLOGIST

## 2025-06-23 PROCEDURE — 3074F SYST BP LT 130 MM HG: CPT | Mod: CPTII,S$GLB,, | Performed by: PSYCHOLOGIST

## 2025-06-23 PROCEDURE — 90833 PSYTX W PT W E/M 30 MIN: CPT | Mod: S$GLB,,, | Performed by: PSYCHOLOGIST

## 2025-06-23 PROCEDURE — 99214 OFFICE O/P EST MOD 30 MIN: CPT | Mod: S$GLB,,, | Performed by: PSYCHOLOGIST

## 2025-06-23 PROCEDURE — 99999 PR PBB SHADOW E&M-EST. PATIENT-LVL III: CPT | Mod: PBBFAC,,, | Performed by: PSYCHOLOGIST

## 2025-06-23 PROCEDURE — 3079F DIAST BP 80-89 MM HG: CPT | Mod: CPTII,S$GLB,, | Performed by: PSYCHOLOGIST

## 2025-06-23 PROCEDURE — G2211 COMPLEX E/M VISIT ADD ON: HCPCS | Mod: S$GLB,,, | Performed by: PSYCHOLOGIST

## 2025-06-23 PROCEDURE — 1159F MED LIST DOCD IN RCRD: CPT | Mod: CPTII,S$GLB,, | Performed by: PSYCHOLOGIST

## 2025-06-23 RX ORDER — PHENTERMINE HYDROCHLORIDE 37.5 MG/1
37.5 TABLET ORAL
COMMUNITY
Start: 2025-06-18

## 2025-06-23 RX ORDER — LAMOTRIGINE 200 MG/1
200 TABLET ORAL DAILY
Qty: 30 TABLET | Refills: 2 | Status: SHIPPED | OUTPATIENT
Start: 2025-06-23 | End: 2026-06-23

## 2025-06-23 RX ORDER — VORTIOXETINE 20 MG/1
1 TABLET, FILM COATED ORAL DAILY
Qty: 90 TABLET | Refills: 1 | Status: SHIPPED | OUTPATIENT
Start: 2025-06-23

## 2025-06-23 RX ORDER — ARIPIPRAZOLE 20 MG/1
TABLET ORAL
Qty: 90 TABLET | Refills: 1 | Status: SHIPPED | OUTPATIENT
Start: 2025-06-23

## 2025-06-23 RX ORDER — ARIPIPRAZOLE 5 MG/1
TABLET ORAL
Qty: 90 TABLET | Refills: 1 | Status: SHIPPED | OUTPATIENT
Start: 2025-06-23

## 2025-06-23 NOTE — PROGRESS NOTES
"Outpatient Psychiatry Follow-Up Visit    Clinical Status of Patient: Outpatient (Ambulatory)  06/23/2025     Chief Complaint: 35 year old female presenting today for a follow-up.       Interval History and Content of Current Session:  Interim Events/Subjective Report/Content of Current Session:  follow-up appointment.    Pt is a 35 year old female with past psychiatric hx of bipolar disorder and polysubstance abuse who presents for follow-up treatment. Pt explained that she feels no different on the higher doses. Pt said that she feels somewhat depressed but not as bad as she was. Pt said that "maybe this is just my new normal". We discussed the role that trauma has and pt agreed to IMTT with Dr. Shelton. Pt denied any other major changes or new stressors.    Past Psychiatric hx: Valium, Elavil, Wellbutrin, Celexa, Trazodone, Buspar, Prozac, Lexapro, Zoloft, Seroquel, Zyprexa, Ritalin, Adderall, Latuda, Vraylar (sedating), Viibryd, Latuda     Past Medical hx:   Past Medical History:   Diagnosis Date    Addiction to drug     THCA as a teenager, last smoked 8 years ago    Anxiety     Bipolar disorder     Bronchitis, chronic     Hallucination     Headache     History of repair of dehiscence of vaginal cuff 11/12/2024    Hx of psychiatric care     Hypertension, essential 11/12/2024    Cait     Psychiatric problem     Psychosis     Sleep difficulties     Substance abuse     Suicide attempt     Therapy         Interim hx:  Medication changes last visit:   increased Lamictal dosage to 200mg Q D and increased Abilify dosage to 25mg Q D  Anxiety: moderate - variable  Depression: moderate -variable     Denies suicidal/homicidal ideations.  Denies hopelessness/worthlessness.    Denies auditory/visual hallucinations      Alcohol: pt denied  Drug: THC nightly and using non-prescription Adderall  Caffeine: moderate use  Tobacco: vapes daily      Review of Systems   PSYCHIATRIC: Pertinent items are noted in the narrative.        " CONSTITUTIONAL: weight stable    Past Medical, Family and Social History: The patient's past medical, family and social history have been reviewed and updated as appropriate within the electronic medical record. See encounter notes.     Current Psychiatric Medication:  Trintellix 20mg once daily, Abilify 25mg Q D, and Lamictal 200mg Q D     Compliance: yes      Side effects: pt denied     Risk Parameters:  Patient reports no suicidal ideation  Patient reports no homicidal ideation  Patient reports no self-injurious behavior  Patient reports no violent behavior     Exam (detailed: at least 9 elements; comprehensive: all 15 elements)   Constitutional  Vitals:  Most recent vital signs, dated less than 90 days prior to this appointment, were reviewed. Pulse:  [93]   BP: (125)/(83)       General:  unremarkable, age appropriate, casual attire, good eye contact, good rapport       Musculoskeletal  Muscle Strength/Tone:  no flaccidity, no tremor    Gait & Station:  normal      Psychiatric                       Speech:  normal tone, normal rate, rhythm, and volume   Mood & Affect:   Depressed, anxious         Thought Process:   Goal directed; Linear    Associations:   intact   Thought Content:   No SI/HI, delusions, or paranoia, no AV/VH   Insight & Judgement:   Good, adequate to circumstances   Orientation:   grossly intact; alert and oriented x 4    Memory:  intact for content of interview    Language:  grossly intact, can repeat    Attention Span  : Grossly intact for content of interview   Fund of Knowledge:   intact and appropriate to age and level of education        Assessment and Diagnosis   Status/Progress: unchanged     Impression: Pt appears to struggled with bipolar disorder that is not well managed with her current medication. Pt also has a history of polysubstance abuse and a long trauma history. Pt should continue in psychiatric medication management, start psychotherapy, and start a cardio exercise regimen.      Diagnosis(es):   1) Bipolar II disorder    Intervention/Counseling/Treatment Plan   Medication Management:      1. Continue Trintellix 20mg once daily, Abilify 25mg Q D, and Lamictal 200mg Q D     2. Start psychotherapy with Dr. Shelton     3. Call to report any worsening of symptoms or problems with the medication. Pt instructed to go to ER with thoughts of harming self, others    Psychotherapy:   Target symptoms: mood lability  Why chosen therapy is appropriate versus another modality: CBT used; relevant to diagnosis, patient responds to this modality  Outcome monitoring methods: self-report, observation  Therapeutic intervention type: Cognitive Behavioral Therapy  Topics discussed/themes: building skills sets for symptom management, symptom recognition, nutrition, exercise  The patient's response to the intervention is fair  Patient's response to treatment is: good.   The patient's progress toward treatment goals: unchanged  16 minutes spent with pt in psychotherapy and 5 minutes in medication management     Return to clinic: 3 months or earlier PRN    -Cognitive-Behavioral/Supportive therapy and psychoeducation provided  -R/B/SE's of medications discussed with the pt who expresses understanding and chooses to take medications as prescribed.   -Pt instructed to call clinic, 911 or go to nearest emergency room if sxs worsen or pt is in   crisis. The pt expresses understanding.    Damian Aleman, PhD, MP     Visit today included increased complexity associated with the care of the episodic problem associated with mental health. Addressed and managed the longitudinal care of the patient due to the serious and/or complex mental health diagnosis.

## 2025-06-23 NOTE — TELEPHONE ENCOUNTER
Called patient to schedule NP appt with Dr. Shelton for trauma, received referral from Dr. Aleman. Left VM to call back to schedule.

## 2025-06-30 ENCOUNTER — TELEPHONE (OUTPATIENT)
Dept: PSYCHIATRY | Facility: CLINIC | Age: 35
End: 2025-06-30
Payer: COMMERCIAL

## 2025-06-30 NOTE — TELEPHONE ENCOUNTER
Rec'd VM at 3602. Pt is returning a call to schedule an appt with Dr. Shelton. See telephone encounter on 6/23/25.

## 2025-07-02 ENCOUNTER — TELEPHONE (OUTPATIENT)
Dept: BARIATRICS | Facility: CLINIC | Age: 35
End: 2025-07-02
Payer: COMMERCIAL

## 2025-07-21 ENCOUNTER — LAB VISIT (OUTPATIENT)
Dept: LAB | Facility: HOSPITAL | Age: 35
End: 2025-07-21
Attending: EMERGENCY MEDICINE
Payer: COMMERCIAL

## 2025-07-21 ENCOUNTER — OFFICE VISIT (OUTPATIENT)
Dept: FAMILY MEDICINE | Facility: CLINIC | Age: 35
End: 2025-07-21
Payer: COMMERCIAL

## 2025-07-21 ENCOUNTER — PATIENT MESSAGE (OUTPATIENT)
Dept: FAMILY MEDICINE | Facility: CLINIC | Age: 35
End: 2025-07-21

## 2025-07-21 VITALS
BODY MASS INDEX: 38.95 KG/M2 | SYSTOLIC BLOOD PRESSURE: 120 MMHG | OXYGEN SATURATION: 99 % | HEART RATE: 103 BPM | TEMPERATURE: 98 F | DIASTOLIC BLOOD PRESSURE: 80 MMHG | WEIGHT: 219.81 LBS | HEIGHT: 63 IN

## 2025-07-21 DIAGNOSIS — I10 HYPERTENSION, ESSENTIAL: Primary | ICD-10-CM

## 2025-07-21 DIAGNOSIS — F41.9 ANXIETY: ICD-10-CM

## 2025-07-21 DIAGNOSIS — R53.83 FATIGUE, UNSPECIFIED TYPE: ICD-10-CM

## 2025-07-21 DIAGNOSIS — Z00.00 ENCOUNTER FOR SCREENING AND PREVENTATIVE CARE: ICD-10-CM

## 2025-07-21 DIAGNOSIS — N81.6 RECTOCELE: ICD-10-CM

## 2025-07-21 DIAGNOSIS — F31.30 BIPOLAR DISORDER, MOST RECENT EPISODE DEPRESSED: ICD-10-CM

## 2025-07-21 DIAGNOSIS — E78.2 MIXED HYPERLIPIDEMIA: ICD-10-CM

## 2025-07-21 DIAGNOSIS — N81.10 FEMALE CYSTOCELE: ICD-10-CM

## 2025-07-21 DIAGNOSIS — E07.9 DISORDER OF THYROID GLAND: ICD-10-CM

## 2025-07-21 DIAGNOSIS — F33.42 RECURRENT MAJOR DEPRESSIVE DISORDER, IN FULL REMISSION: ICD-10-CM

## 2025-07-21 PROBLEM — F32.81 PMDD (PREMENSTRUAL DYSPHORIC DISORDER): Status: RESOLVED | Noted: 2021-03-05 | Resolved: 2025-07-21

## 2025-07-21 PROBLEM — R76.8 ANTINUCLEAR ANTIBODY (ANA) POSITIVE: Status: RESOLVED | Noted: 2025-05-07 | Resolved: 2025-07-21

## 2025-07-21 PROBLEM — J32.2 CHRONIC ETHMOIDITIS: Status: ACTIVE | Noted: 2025-07-21

## 2025-07-21 PROBLEM — Z90.79 H/O TOTAL HYSTERECTOMY WITH BILATERAL SALPINGO-OOPHORECTOMY (BSO): Status: ACTIVE | Noted: 2024-07-31

## 2025-07-21 PROBLEM — F33.9 RECURRENT MAJOR DEPRESSION: Status: ACTIVE | Noted: 2021-01-26

## 2025-07-21 PROBLEM — Z90.79 H/O TOTAL HYSTERECTOMY WITH BILATERAL SALPINGO-OOPHORECTOMY (BSO): Status: RESOLVED | Noted: 2024-07-31 | Resolved: 2025-07-21

## 2025-07-21 PROBLEM — Z90.722 H/O TOTAL HYSTERECTOMY WITH BILATERAL SALPINGO-OOPHORECTOMY (BSO): Status: ACTIVE | Noted: 2024-07-31

## 2025-07-21 PROBLEM — J34.2 ACQUIRED DEFLECTED NASAL SEPTUM: Status: RESOLVED | Noted: 2023-06-11 | Resolved: 2025-07-21

## 2025-07-21 PROBLEM — Z90.710 H/O TOTAL HYSTERECTOMY WITH BILATERAL SALPINGO-OOPHORECTOMY (BSO): Status: RESOLVED | Noted: 2024-07-31 | Resolved: 2025-07-21

## 2025-07-21 PROBLEM — Z90.710 H/O TOTAL HYSTERECTOMY WITH BILATERAL SALPINGO-OOPHORECTOMY (BSO): Status: ACTIVE | Noted: 2024-07-31

## 2025-07-21 PROBLEM — N95.1 HOT FLASHES DUE TO MENOPAUSE: Status: RESOLVED | Noted: 2024-07-31 | Resolved: 2025-07-21

## 2025-07-21 PROBLEM — R73.9 HYPERGLYCEMIA: Status: RESOLVED | Noted: 2025-04-08 | Resolved: 2025-07-21

## 2025-07-21 PROBLEM — O24.419 GESTATIONAL DIABETES MELLITUS (GDM), ANTEPARTUM: Status: RESOLVED | Noted: 2024-11-12 | Resolved: 2025-07-21

## 2025-07-21 PROBLEM — F32.81 PMDD (PREMENSTRUAL DYSPHORIC DISORDER): Status: ACTIVE | Noted: 2021-03-05

## 2025-07-21 PROBLEM — Z98.82 HISTORY OF BREAST AUGMENTATION: Status: RESOLVED | Noted: 2024-11-12 | Resolved: 2025-07-21

## 2025-07-21 PROBLEM — Z90.722 H/O TOTAL HYSTERECTOMY WITH BILATERAL SALPINGO-OOPHORECTOMY (BSO): Status: RESOLVED | Noted: 2024-07-31 | Resolved: 2025-07-21

## 2025-07-21 PROBLEM — J32.0 CHRONIC MAXILLARY SINUSITIS: Status: RESOLVED | Noted: 2023-06-11 | Resolved: 2025-07-21

## 2025-07-21 PROBLEM — N39.3 STRESS INCONTINENCE: Status: RESOLVED | Noted: 2022-10-18 | Resolved: 2025-07-21

## 2025-07-21 PROBLEM — R06.09 DYSPNEA ON EXERTION: Status: RESOLVED | Noted: 2025-04-08 | Resolved: 2025-07-21

## 2025-07-21 PROBLEM — N93.9 VAGINAL BLEEDING: Status: RESOLVED | Noted: 2023-03-22 | Resolved: 2025-07-21

## 2025-07-21 PROBLEM — F51.02 INSOMNIA DUE TO PSYCHOLOGICAL STRESS: Status: RESOLVED | Noted: 2018-10-26 | Resolved: 2025-07-21

## 2025-07-21 PROBLEM — Z98.890: Status: RESOLVED | Noted: 2024-11-12 | Resolved: 2025-07-21

## 2025-07-21 PROBLEM — M25.50 ARTHRALGIA OF MULTIPLE JOINTS: Status: RESOLVED | Noted: 2025-04-08 | Resolved: 2025-07-21

## 2025-07-21 PROBLEM — E66.01 SEVERE OBESITY: Status: RESOLVED | Noted: 2025-05-07 | Resolved: 2025-07-21

## 2025-07-21 PROBLEM — N39.46 MIXED INCONTINENCE: Status: ACTIVE | Noted: 2022-10-17

## 2025-07-21 PROBLEM — J34.3 HYPERTROPHY OF NASAL TURBINATES: Status: RESOLVED | Noted: 2023-06-11 | Resolved: 2025-07-21

## 2025-07-21 PROBLEM — E66.01 SEVERE OBESITY: Status: ACTIVE | Noted: 2025-05-07

## 2025-07-21 LAB
T3FREE SERPL-MCNC: 3 PG/ML (ref 2.3–4.2)
T4 FREE SERPL-MCNC: 0.79 NG/DL (ref 0.71–1.51)
TSH SERPL-ACNC: 0.84 UIU/ML (ref 0.4–4)

## 2025-07-21 PROCEDURE — 3074F SYST BP LT 130 MM HG: CPT | Mod: CPTII,S$GLB,, | Performed by: EMERGENCY MEDICINE

## 2025-07-21 PROCEDURE — 1159F MED LIST DOCD IN RCRD: CPT | Mod: CPTII,S$GLB,, | Performed by: EMERGENCY MEDICINE

## 2025-07-21 PROCEDURE — 36415 COLL VENOUS BLD VENIPUNCTURE: CPT | Mod: PO

## 2025-07-21 PROCEDURE — G2211 COMPLEX E/M VISIT ADD ON: HCPCS | Mod: S$GLB,,, | Performed by: EMERGENCY MEDICINE

## 2025-07-21 PROCEDURE — 99999 PR PBB SHADOW E&M-EST. PATIENT-LVL IV: CPT | Mod: PBBFAC,,, | Performed by: EMERGENCY MEDICINE

## 2025-07-21 PROCEDURE — 84443 ASSAY THYROID STIM HORMONE: CPT

## 2025-07-21 PROCEDURE — 3044F HG A1C LEVEL LT 7.0%: CPT | Mod: CPTII,S$GLB,, | Performed by: EMERGENCY MEDICINE

## 2025-07-21 PROCEDURE — 3008F BODY MASS INDEX DOCD: CPT | Mod: CPTII,S$GLB,, | Performed by: EMERGENCY MEDICINE

## 2025-07-21 PROCEDURE — 3079F DIAST BP 80-89 MM HG: CPT | Mod: CPTII,S$GLB,, | Performed by: EMERGENCY MEDICINE

## 2025-07-21 PROCEDURE — 84439 ASSAY OF FREE THYROXINE: CPT

## 2025-07-21 PROCEDURE — 99214 OFFICE O/P EST MOD 30 MIN: CPT | Mod: S$GLB,,, | Performed by: EMERGENCY MEDICINE

## 2025-07-21 PROCEDURE — 84481 FREE ASSAY (FT-3): CPT

## 2025-07-21 NOTE — ASSESSMENT & PLAN NOTE
Patient relates that she had a problem with her thyroid but is not on medication and is unsure of what the disorder was.    Orders:    T4, Free; Future    T3, Free; Future

## 2025-07-21 NOTE — ASSESSMENT & PLAN NOTE
Patient relates continuous fatigue in multiple situations.  She can not lose weight despite cutting calories and trying to exercise.  She was on Adipex but only lost 2 pounds.    Orders:    TSH; Future

## 2025-07-21 NOTE — ASSESSMENT & PLAN NOTE
Patient has recurrent problem and would like a second opinion.  We will refer to GYN for a start.      Orders:    Ambulatory referral/consult to Obstetrics / Gynecology; Future

## 2025-07-21 NOTE — PROGRESS NOTES
??NEW PATIENT??  Name: Marly Mayfield  MRN: [id number]  : 1990    Subjective     Subjective   HPI  Marly Mayfield is here today to establish care.    Objective     Review of Systems   Constitutional: Negative.    HENT: Negative.     Eyes: Negative.    Respiratory: Negative.     Cardiovascular: Negative.    Gastrointestinal: Negative.    Endocrine: Negative.    Genitourinary: Negative.    Musculoskeletal: Negative.    Allergic/Immunologic: Negative.    Neurological: Negative.    Hematological: Negative.    Psychiatric/Behavioral: Negative.     All other systems reviewed and are negative.       Problem List[1]    Medications Ordered Prior to Encounter[2]    Past Medical History:   Diagnosis Date    Acquired deflected nasal septum 2023    Added automatically from request for surgery [id number]      Addiction to drug     THCA as a teenager, last smoked 8 years ago    Anxiety     Arthralgia of multiple joints 2025    Bipolar disorder     Bronchitis, chronic     Chronic maxillary sinusitis 2023    Added automatically from request for surgery [id number]      Disorder of thyroid gland 2025    Dyspnea on exertion 2025    H/O total hysterectomy with bilateral salpingo-oophorectomy (BSO) 2024    Hallucination     Headache     History of breast augmentation 2024    History of repair of dehiscence of vaginal cuff 2024    History of repair of dehiscence of vaginal cuff 2024    Hot flashes due to menopause 2024    Hx of psychiatric care     Hypertension, essential 2024    Hypertrophy of nasal turbinates 2023    Added automatically from request for surgery [id number]      Cait     PMDD (premenstrual dysphoric disorder) 2021    Psychiatric problem     Psychosis     Severe obesity 2025    Sleep difficulties     Stress incontinence 10/18/2022    Added automatically from request for surgery [id number]      Substance abuse     Suicide attempt     Therapy      "Vaginal bleeding 03/22/2023       Past Surgical History:   Procedure Laterality Date    BREAST SURGERY      DILATION AND CURETTAGE OF UTERUS  2019    HYSTERECTOMY  03/01/2023    Dr. Juan Labadie and Layne Duarte    REPAIR OF VAGINAL CUFF  03/22/2023    VAGINAL HYSTERECTOMY W/ ANTERIOR AND POSTERIOR VAGINAL REPAIR  03/01/2023    Drs. J Labadie and Layne Duarte        Family History   Problem Relation Name Age of Onset    Hypertension Mother      Heart disease Mother      Heart failure Mother      Liver disease Father      Bipolar disorder Father      Schizophrenia Father      Alcohol abuse Father      Drug abuse Father      Anxiety disorder Paternal Grandfather      Dementia Paternal Grandfather      Breast cancer Neg Hx      Colon cancer Neg Hx      Ovarian cancer Neg Hx         Social History[3]    Review of patient's allergies indicates:   Allergen Reactions    Sulfa (sulfonamide antibiotics) Anaphylaxis, Swelling, Hives and Other (See Comments)    Sertraline Other (See Comments)     Other reaction(s): Other (See Comments)    MAKES "ME GO CRAZY"        Health Maintenance Due   Topic Date Due    COVID-19 Vaccine (3 - 2024-25 season) 09/01/2024       Objective   Vitals:    07/21/25 1315   BP: 120/80   Pulse: 103   Temp: 98.1 °F (36.7 °C)        Physical Exam  Vitals and nursing note reviewed.   Constitutional:       General: She is not in acute distress.     Appearance: Normal appearance. She is well-developed.   HENT:      Head: Normocephalic and atraumatic.      Right Ear: External ear normal.      Left Ear: External ear normal.      Mouth/Throat:      Mouth: Mucous membranes are moist.   Eyes:      General: Lids are normal. Gaze aligned appropriately.      Extraocular Movements: Extraocular movements intact.      Conjunctiva/sclera: Conjunctivae normal.      Pupils: Pupils are equal, round, and reactive to light.   Cardiovascular:      Rate and Rhythm: Normal rate and regular rhythm.      Heart sounds: No murmur heard.   "   No friction rub. No gallop.   Pulmonary:      Effort: Pulmonary effort is normal. No respiratory distress.      Breath sounds: Normal breath sounds and air entry. No wheezing, rhonchi or rales.   Abdominal:      General: Abdomen is flat. There is no distension.   Musculoskeletal:         General: No swelling or deformity.      Cervical back: Full passive range of motion without pain, normal range of motion and neck supple.      Right lower leg: No edema.      Left lower leg: No edema.   Skin:     General: Skin is warm and dry.      Coloration: Skin is not jaundiced.   Neurological:      General: No focal deficit present.      Mental Status: She is alert and oriented to person, place, and time. Mental status is at baseline.      GCS: GCS eye subscore is 4. GCS verbal subscore is 5. GCS motor subscore is 6.   Psychiatric:         Attention and Perception: Attention and perception normal.         Mood and Affect: Mood normal.         Speech: Speech normal.         Behavior: Behavior normal. Behavior is cooperative.         Thought Content: Thought content normal.         Cognition and Memory: Cognition normal.         Judgment: Judgment normal.          Screening Tools     ?? Alcohol Use Screening ??    Patient denies Alcohol use [x]    CAGE Questionnaire:  - Have you ever felt you should Cut down on your drinking?  [] Yes   [] No  - Have people Annoyed you by criticizing your drinking?     [] Yes   [] No  - Have you ever felt Guilty about your drinking?            [] Yes   [] No  - Have you ever had a drink first thing in the morning       to steady your nerves or get rid of a hangover (Eye-opener)? [] Yes   [] No    [] CAGE score >=2: Positive screen for potential alcohol use disorder  [] CAGE score <=2: Negative screen for potential alcohol use disorder    Medical Decision Making     Assessment & Plan  Encounter for screening and preventative care  Patient needs some health maintenance items updating, we discussed  them and the patient was agreeable with all orders placed.  We will obtain them and then address any issues.  This was discussed with the patient and they are aware that we will address results via the [facility] and as needed.    Advised that a healthy lifestyle is built on a foundation of balanced nutrition, regular physical activity, sufficient sleep, stress management, and positive social connections. We discussed prioritizing whole, nutrient-dense foods, staying hydrated, engaging in movement daily, and maintaining mental well-being contribute to overall health. Patient advised to avoid harmful habits like smoking, excessive alcohol consumption, and chronic stress.      Orders:    Ambulatory referral/consult to Obstetrics / Gynecology; Future    Hypertension, essential  Patient is not taking any medication and is at goal.         Mixed hyperlipidemia  Patient not taking any medication at this time, last lipid panel was good.         Disorder of thyroid gland  Patient relates that she had a problem with her thyroid but is not on medication and is unsure of what the disorder was.    Orders:    T4, Free; Future    T3, Free; Future    Fatigue, unspecified type  Patient relates continuous fatigue in multiple situations.  She can not lose weight despite cutting calories and trying to exercise.  She was on Adipex but only lost 2 pounds.    Orders:    TSH; Future    Anxiety  Bipolar disorder, most recent episode depressed  Recurrent major depressive disorder, in full remission  Patient sees a psychiatrist and is pending a psychologist / therapist.           Rectocele  Female cystocele  Patient has recurrent problem and would like a second opinion.  We will refer to GYN for a start.      Orders:    Ambulatory referral/consult to Obstetrics / Gynecology; Future         Additional Discussion     ?? Vaccine discussion ??    Patient declined the following vaccines today after discussion of risk / benefits:     []  Tetanus  [] Pneumococcal  [x] COVID 19  [] Respiratory Syncytial Virus  [] Shingles / Zoster    Patient will go to the Pharmacy for the following vaccines, we discussed risks / benefits:    [] Tetanus  [] Pneumococcal  [] COVID 19  [] Respiratory Syncytial Virus  [] Shingles / Zoster    Continuity     Follow up  6 months    Patient is encouraged to contact me at anytime via [facility] or my office for any needs.    Aristeo Scherer MD  Primary Care ??  07/21/2025     DISCLAIMER: This note was prepared with [employer] voice recognition transcription software. Garbled syntax, mangled pronouns, and other bizarre constructions may be attributed to that software system.  I attest to having reviewed and edited the generated note for accuracy, though some syntax or spelling errors may persist. Please contact the author of this note for any clarification.       [1]   Patient Active Problem List  Diagnosis    Bipolar disorder, most recent episode depressed    Migraine without aura and without status migrainosus, not intractable 5/2020 neuro - topamax    Hypertension, essential    Fatigue    BMI 39.0-39.9,adult    Female cystocele    Anxiety    Chronic ethmoiditis    Disorder of thyroid gland    Mixed hyperlipidemia    Mixed incontinence    Rectocele    Recurrent major depression   [2]   Current Outpatient Medications on File Prior to Visit   Medication Sig Dispense Refill    ARIPiprazole (ABILIFY) 20 MG Tab Take 1 tablet (20mg) with 5mg tablet to equal 25mg once daily 90 tablet 1    ARIPiprazole (ABILIFY) 5 MG Tab Take 1 tablet (5mg) with 20mg tablet to equal 25mg once daily 90 tablet 1    lamoTRIgine (LAMICTAL) 200 MG tablet Take 1 tablet (200 mg total) by mouth once daily. 30 tablet 2    TRINTELLIX 20 mg Tab Take 1 tablet (20 mg total) by mouth once daily. 90 tablet 1    [DISCONTINUED] phentermine (ADIPEX-P) 37.5 mg tablet Take 37.5 mg by mouth. (Patient not taking: Reported on 7/21/2025)       No current  facility-administered medications on file prior to visit.   [3]   Social History  Socioeconomic History    Marital status:      Spouse name: Ignacio    Number of children: 2   Occupational History    Occupation:      Comment: [employer]   Tobacco Use    Smoking status: Former     Types: Vaping with nicotine, Cigarettes     Start date:      Quit date: 2004     Years since quittin.5    Smokeless tobacco: Never   Substance and Sexual Activity    Alcohol use: Yes     Alcohol/week: 0.8 standard drinks of alcohol     Types: 1 Standard drinks or equivalent per week     Comment: social    Drug use: Not Currently     Types: Marijuana     Comment: stopped 8 years ago at age 20    Sexual activity: Yes     Partners: Male, Female     Birth control/protection: None     Comment: presently male only   Other Topics Concern    Patient feels they ought to cut down on drinking/drug use No    Patient annoyed by others criticizing their drinking/drug use No    Patient has felt bad or guilty about drinking/drug use No    Patient has had a drink/used drugs as an eye opener in the AM No   Social History Narrative    Lives with  and two kids.    Mother passed away 2018.    Father is in [facility] for murdering her stepsister.     Social Drivers of Health     Financial Resource Strain: Medium Risk (2025)    Overall Financial Resource Strain (CARDIA)     Difficulty of Paying Living Expenses: Somewhat hard   Food Insecurity: Patient Declined (2025)    Hunger Vital Sign     Worried About Running Out of Food in the Last Year: Patient declined     Ran Out of Food in the Last Year: Patient declined   Transportation Needs: No Transportation Needs (2025)    PRAPARE - Transportation     Lack of Transportation (Medical): No     Lack of Transportation (Non-Medical): No   Physical Activity: Insufficiently Active (2025)    Exercise Vital Sign     Days of Exercise per Week: 1 day     Minutes of Exercise per  Session: 60 min   Stress: Stress Concern Present (6/22/2025)    [employer] - Occupational Stress Questionnaire     Feeling of Stress : Very much   Housing Stability: High Risk (6/22/2025)    Housing Stability Vital Sign     Unable to Pay for Housing in the Last Year: Yes     Number of Times Moved in the Last Year: 2     Homeless in the Last Year: No

## 2025-07-21 NOTE — PATIENT INSTRUCTIONS
Thank you for visiting us today. We're dedicated to supporting you in achieving your healthiest self.    It's important to keep all scheduled appointments and follow up as discussed to ensure the best outcomes.    Whenever possible, please contact our office for urgent needs rather than going directly to Urgent Care or the Emergency Room.     Of course, if you're experiencing a true emergency, do not hesitate to go to the Emergency Room or call 911!    However, let's work together to use our healthcare resources wisely and reserve emergency services for situations that truly require them.    If you have any questions or anything to add you can reach me via [facility] at any time.       Aristeo Scherer M.D.  [facility]  [facility] [place]    If you are happy with your care please consider adding a review at;    Dr. Aristeo Scherer MD - Family Medicine Physician in [facility]

## 2025-07-22 ENCOUNTER — TELEPHONE (OUTPATIENT)
Dept: FAMILY MEDICINE | Facility: CLINIC | Age: 35
End: 2025-07-22
Payer: COMMERCIAL

## 2025-07-22 DIAGNOSIS — E66.812 CLASS 2 SEVERE OBESITY DUE TO EXCESS CALORIES WITH SERIOUS COMORBIDITY AND BODY MASS INDEX (BMI) OF 38.0 TO 38.9 IN ADULT: Primary | ICD-10-CM

## 2025-07-22 DIAGNOSIS — E66.01 CLASS 2 SEVERE OBESITY DUE TO EXCESS CALORIES WITH SERIOUS COMORBIDITY AND BODY MASS INDEX (BMI) OF 38.0 TO 38.9 IN ADULT: Primary | ICD-10-CM

## 2025-07-22 RX ORDER — TIRZEPATIDE 2.5 MG/.5ML
2.5 INJECTION, SOLUTION SUBCUTANEOUS
Qty: 2 ML | Refills: 0 | Status: SHIPPED | OUTPATIENT
Start: 2025-07-22 | End: 2025-07-25 | Stop reason: ALTCHOICE

## 2025-07-22 NOTE — TELEPHONE ENCOUNTER
This message is in regards to GLP-1. Mounjaro    Under the patient's insurance plan  this medication is ONLY approved for the following diagnoses: TYPE 2 DIABETES with a Hemoglobin A1C of 6.5 or greater.   After reviewing the patient's chart, there was not information that the patient has that diagnosis.   Insurance companies states that any diagnosis such as the following: METABOLIC SYNDROME, INSULIN RESISTANCE, PREDIABETES, WEIGHT LOSS MANAGEMENT, OR OBESITY will not be approved as these are not approved by the FDA.     Victoza, Ozempic , Trulicity, and Mounjaro are ONLY approved for Type 2 Diabetes and no other indication per FDA.     The patient does not meet the criteria to submit a prior authorization. If there is more information you would like to provide that would help the patient meet the criteria, please do not hesitate to reach out to us.     Thank you.     Sincerely      Family Practice   Special Services Coordinators  Prior Authorization Department    English

## 2025-07-24 ENCOUNTER — TELEPHONE (OUTPATIENT)
Dept: PSYCHIATRY | Facility: CLINIC | Age: 35
End: 2025-07-24
Payer: COMMERCIAL

## 2025-07-24 RX ORDER — BUTALBITAL, ACETAMINOPHEN AND CAFFEINE 50; 325; 40 MG/1; MG/1; MG/1
1 TABLET ORAL EVERY 4 HOURS PRN
Qty: 30 TABLET | Refills: 0 | Status: SHIPPED | OUTPATIENT
Start: 2025-07-24 | End: 2025-08-23

## 2025-07-24 NOTE — TELEPHONE ENCOUNTER
Please send a NO SHOW letter to patient for missing today's intake appointment. Thank you  Received: Today  Kitty Shelton PsyD Marechal, [place], MA  Caller: Unspecified (Today,  3:15 PM)

## 2025-07-25 RX ORDER — TIRZEPATIDE 2.5 MG/.5ML
2.5 INJECTION, SOLUTION SUBCUTANEOUS
Qty: 2 ML | Refills: 0 | Status: SHIPPED | OUTPATIENT
Start: 2025-07-25 | End: 2025-08-24

## 2025-07-25 NOTE — TELEPHONE ENCOUNTER
Would you like to change RX to a weight loss glp?     This message is in regards to GLP-1. Mounjaro    Under the patient's insurance plan  this medication is ONLY approved for the following diagnoses: TYPE 2 DIABETES with a Hemoglobin A1C of 6.5 or greater.   After reviewing the patient's chart, there was not information that the patient has that diagnosis.   Insurance companies states that any diagnosis such as the following: METABOLIC SYNDROME, INSULIN RESISTANCE, PREDIABETES, WEIGHT LOSS MANAGEMENT, OR OBESITY will not be approved as these are not approved by the FDA.     Victoza, Ozempic , Trulicity, and Mounjaro are ONLY approved for Type 2 Diabetes and no other indication per FDA.     The patient does not meet the criteria to submit a prior authorization. If there is more information you would like to provide that would help the patient meet the criteria, please do not hesitate to reach out to us.     Thank you.     Sincerely      Family Practice   Special Services Coordinators  Prior Authorization Department

## 2025-07-26 NOTE — TELEPHONE ENCOUNTER
KATE HUGHES (Ni: [id number])- denied        Your demographic data has been sent to [facility] successfully!  [facility] typically takes up to one hour to respond, but it may take a little longer in some cases. You will be notified by email when available. You can also check for an update later by opening this request from your dashboard. Please do not fax or call [facility] to resubmit this request. If you need assistance, please chat with [facility] or call us at [phone].  If it has been longer than 24 hours, please reach out to [facility].

## 2025-07-28 ENCOUNTER — HOSPITAL ENCOUNTER (EMERGENCY)
Facility: HOSPITAL | Age: 35
Discharge: HOME | End: 2025-07-28
Attending: EMERGENCY MEDICINE
Payer: COMMERCIAL

## 2025-07-28 VITALS
TEMPERATURE: 99 F | HEIGHT: 64 IN | SYSTOLIC BLOOD PRESSURE: 131 MMHG | WEIGHT: 220 LBS | RESPIRATION RATE: 16 BRPM | OXYGEN SATURATION: 98 % | DIASTOLIC BLOOD PRESSURE: 86 MMHG | HEART RATE: 91 BPM | BODY MASS INDEX: 37.56 KG/M2

## 2025-07-28 DIAGNOSIS — S61.411A LACERATION OF RIGHT HAND, FOREIGN BODY PRESENCE UNSPECIFIED, INITIAL ENCOUNTER: Primary | ICD-10-CM

## 2025-07-28 PROCEDURE — 12001 RPR S/N/AX/GEN/TRNK 2.5CM/<: CPT

## 2025-07-28 PROCEDURE — 99282 EMERGENCY DEPT VISIT SF MDM: CPT | Mod: 25

## 2025-07-28 NOTE — Clinical Note
"Marly VALLES "Marly Mayfield was seen and treated in our emergency department on 7/28/2025.  She may return to work on 07/31/2025.       If you have any questions or concerns, please don't hesitate to call.      Sherif Pruett MD"

## 2025-07-28 NOTE — DISCHARGE INSTRUCTIONS
Wound rechecked in 5 days.  Return to ER if redness, swelling, fever, chills, streaking up the arm or any concern.

## 2025-07-28 NOTE — ED PROVIDER NOTES
"Encounter Date: 7/28/2025       History     Chief Complaint   Patient presents with    Laceration     Laceration to right hand from broken glass     Patient was washing dishes when she accidentally cut her right hand on the palmar surface with a kitchen knife.  She complains of bleeding and laceration.  She denies any difficulty moving fingers.  At the worst symptoms are mild-to-moderate.  Her last tetanus was in 2021.      Review of patient's allergies indicates:   Allergen Reactions    Sulfa (sulfonamide antibiotics) Anaphylaxis, Swelling, Hives and Other (See Comments)    Sertraline Other (See Comments)     Other reaction(s): Other (See Comments)    MAKES "ME GO CRAZY"     Past Medical History:   Diagnosis Date    Acquired deflected nasal septum 06/11/2023    Added automatically from request for surgery [id number]      Addiction to drug     THCA as a teenager, last smoked 8 years ago    Anxiety     Arthralgia of multiple joints 04/08/2025    Bipolar disorder     Bronchitis, chronic     Chronic maxillary sinusitis 06/11/2023    Added automatically from request for surgery [id number]      Disorder of thyroid gland 04/08/2025    Dyspnea on exertion 04/08/2025    H/O total hysterectomy with bilateral salpingo-oophorectomy (BSO) 07/31/2024    Hallucination     Headache     History of breast augmentation 11/12/2024    History of repair of dehiscence of vaginal cuff 11/12/2024    History of repair of dehiscence of vaginal cuff 11/12/2024    Hot flashes due to menopause 07/31/2024    Hx of psychiatric care     Hypertension, essential 11/12/2024    Hypertrophy of nasal turbinates 06/11/2023    Added automatically from request for surgery [id number]      Cait     PMDD (premenstrual dysphoric disorder) 03/05/2021    Psychiatric problem     Psychosis     Severe obesity 05/07/2025    Sleep difficulties     Stress incontinence 10/18/2022    Added automatically from request for surgery [id number]      Substance abuse     Suicide attempt  "    Therapy     Vaginal bleeding 03/22/2023     Past Surgical History:   Procedure Laterality Date    BREAST SURGERY      DILATION AND CURETTAGE OF UTERUS  2019    HYSTERECTOMY  03/01/2023    Dr. Juan Labadie and Layne Duarte    REPAIR OF VAGINAL CUFF  03/22/2023    VAGINAL HYSTERECTOMY W/ ANTERIOR AND POSTERIOR VAGINAL REPAIR  03/01/2023    Drs. J Labadie and Layne Duarte     Family History   Problem Relation Name Age of Onset    Hypertension Mother      Heart disease Mother      Heart failure Mother      Liver disease Father      Bipolar disorder Father      Schizophrenia Father      Alcohol abuse Father      Drug abuse Father      Anxiety disorder Paternal Grandfather      Dementia Paternal Grandfather      Breast cancer Neg Hx      Colon cancer Neg Hx      Ovarian cancer Neg Hx       Social History[1]  Review of Systems   All other systems reviewed and are negative.      Physical Exam     Initial Vitals   BP Pulse Resp Temp SpO2   -- -- -- -- --      MAP       --         Physical Exam    Nursing note and vitals reviewed.  Constitutional: She appears well-developed and well-nourished.   Pleasant, polite   HENT:   Head: Normocephalic and atraumatic.   Eyes: EOM are normal.   Neck: Neck supple.   Normal range of motion.  Cardiovascular:  Intact distal pulses.           Pulmonary/Chest: No respiratory distress.   Musculoskeletal:      Cervical back: Normal range of motion and neck supple.      Comments: There is full range of motion of all digits of the right hand.  There is good flexion and extension of all digits.  She is neurovascularly intact.  There indeed is a superficial laceration measuring 1-1/2 cm to the palmar surface of the right hand.  There is no tendon exposed.  There is no foreign body.     Neurological: She is alert and oriented to person, place, and time.   Skin: Skin is warm and dry. Capillary refill takes less than 2 seconds.   Psychiatric: She has a normal mood and affect. Her behavior is normal.  Judgment and thought content normal.         ED Course   Lac Repair    Date/Time: 2025 2:13 PM    Performed by: Sherif Pruett MD  Authorized by: Sherif Pruett MD    Consent:     Consent obtained:  Verbal    Risks discussed:  Infection  Laceration details:     Location:  Hand    Hand location:  R palm    Wound length (cm): 1.5.  Pre-procedure details:     Preparation:  Patient was prepped and draped in usual sterile fashion  Exploration:     Hemostasis achieved with:  Direct pressure    Imaging outcome: foreign body not noted      Wound exploration: wound explored through full range of motion      Wound extent: no nerve damage noted, no tendon damage noted and no vascular damage noted    Treatment:     Area cleansed with:  Saline    Amount of cleaning:  Standard    Irrigation solution:  Sterile saline  Skin repair:     Repair method:  Tissue adhesive  Approximation:     Approximation:  Close  Repair type:     Repair type:  Simple  Post-procedure details:     Dressing:  Non-adherent dressing    Procedure completion:  Tolerated well, no immediate complications    Labs Reviewed - No data to display       Imaging Results    None          Medications - No data to display  Medical Decision Making                                        Clinical Impression:  Final diagnoses:  [S61.411A] Laceration of right hand, foreign body presence unspecified, initial encounter (Primary)          ED Disposition Condition    Discharge Stable          ED Prescriptions    None       Follow-up Information       Follow up With Specialties Details Why Contact Info    Aristeo Scherer MD Family Medicine Schedule an appointment as soon as possible for a visit in 1 week  [address] [facility] [address] [place] [address] [phone]                     [1]   Social History  Tobacco Use    Smoking status: Former     Types: Vaping with nicotine, Cigarettes     Start date:      Quit date: 2004     Years since quittin.5    Smokeless tobacco:  Never   Substance Use Topics    Alcohol use: Yes     Alcohol/week: 0.8 standard drinks of alcohol     Types: 1 Standard drinks or equivalent per week     Comment: social    Drug use: Not Currently     Types: Marijuana     Comment: stopped 8 years ago at age 20        Sherif Pruett MD  07/28/25 1752

## 2025-08-11 ENCOUNTER — PATIENT MESSAGE (OUTPATIENT)
Dept: FAMILY MEDICINE | Facility: CLINIC | Age: 35
End: 2025-08-11
Payer: COMMERCIAL

## 2025-08-12 RX ORDER — BUTALBITAL, ACETAMINOPHEN AND CAFFEINE 50; 325; 40 MG/1; MG/1; MG/1
1 TABLET ORAL EVERY 4 HOURS PRN
Qty: 30 TABLET | Refills: 0 | Status: SHIPPED | OUTPATIENT
Start: 2025-08-12 | End: 2025-09-11

## 2025-08-20 ENCOUNTER — OFFICE VISIT (OUTPATIENT)
Dept: FAMILY MEDICINE | Facility: CLINIC | Age: 35
End: 2025-08-20
Payer: COMMERCIAL

## 2025-08-20 VITALS
HEART RATE: 97 BPM | DIASTOLIC BLOOD PRESSURE: 82 MMHG | OXYGEN SATURATION: 98 % | SYSTOLIC BLOOD PRESSURE: 130 MMHG | BODY MASS INDEX: 37.56 KG/M2 | HEIGHT: 64 IN | WEIGHT: 220 LBS

## 2025-08-20 DIAGNOSIS — R53.82 CHRONIC FATIGUE: ICD-10-CM

## 2025-08-20 DIAGNOSIS — R63.4 WEIGHT LOSS: ICD-10-CM

## 2025-08-20 DIAGNOSIS — G44.029 CHRONIC CLUSTER HEADACHE, NOT INTRACTABLE: ICD-10-CM

## 2025-08-20 PROCEDURE — 99999 PR PBB SHADOW E&M-EST. PATIENT-LVL III: CPT | Mod: PBBFAC,,, | Performed by: EMERGENCY MEDICINE

## 2025-08-20 PROCEDURE — 3008F BODY MASS INDEX DOCD: CPT | Mod: CPTII,S$GLB,, | Performed by: EMERGENCY MEDICINE

## 2025-08-20 PROCEDURE — 3044F HG A1C LEVEL LT 7.0%: CPT | Mod: CPTII,S$GLB,, | Performed by: EMERGENCY MEDICINE

## 2025-08-20 PROCEDURE — 3075F SYST BP GE 130 - 139MM HG: CPT | Mod: CPTII,S$GLB,, | Performed by: EMERGENCY MEDICINE

## 2025-08-20 PROCEDURE — 3079F DIAST BP 80-89 MM HG: CPT | Mod: CPTII,S$GLB,, | Performed by: EMERGENCY MEDICINE

## 2025-08-20 PROCEDURE — G2211 COMPLEX E/M VISIT ADD ON: HCPCS | Mod: S$GLB,,, | Performed by: EMERGENCY MEDICINE

## 2025-08-20 PROCEDURE — 99214 OFFICE O/P EST MOD 30 MIN: CPT | Mod: S$GLB,,, | Performed by: EMERGENCY MEDICINE

## 2025-08-20 RX ORDER — TIRZEPATIDE 2.5 MG/.5ML
2.5 INJECTION, SOLUTION SUBCUTANEOUS
Qty: 2 ML | Refills: 0 | Status: SHIPPED | OUTPATIENT
Start: 2025-08-20 | End: 2025-08-21 | Stop reason: ALTCHOICE

## 2025-08-20 RX ORDER — TOPIRAMATE 25 MG/1
25 TABLET, FILM COATED ORAL 2 TIMES DAILY
Qty: 60 TABLET | Refills: 11 | Status: SHIPPED | OUTPATIENT
Start: 2025-08-20 | End: 2026-08-20

## 2025-08-21 ENCOUNTER — TELEPHONE (OUTPATIENT)
Dept: FAMILY MEDICINE | Facility: CLINIC | Age: 35
End: 2025-08-21
Payer: COMMERCIAL

## 2025-08-21 DIAGNOSIS — R63.4 WEIGHT LOSS: Primary | ICD-10-CM

## 2025-08-21 RX ORDER — TIRZEPATIDE 2.5 MG/.5ML
2.5 INJECTION, SOLUTION SUBCUTANEOUS
Qty: 2 ML | Refills: 2 | Status: SHIPPED | OUTPATIENT
Start: 2025-08-21

## 2025-08-21 RX ORDER — TIRZEPATIDE 2.5 MG/.5ML
2.5 INJECTION, SOLUTION SUBCUTANEOUS
Qty: 2 ML | Refills: 0 | Status: SHIPPED | OUTPATIENT
Start: 2025-08-21 | End: 2025-09-20

## 2025-08-22 ENCOUNTER — PATIENT MESSAGE (OUTPATIENT)
Dept: FAMILY MEDICINE | Facility: CLINIC | Age: 35
End: 2025-08-22
Payer: COMMERCIAL

## 2025-08-28 RX ORDER — BUTALBITAL, ACETAMINOPHEN AND CAFFEINE 50; 325; 40 MG/1; MG/1; MG/1
1 TABLET ORAL EVERY 4 HOURS PRN
Qty: 30 TABLET | Refills: 0 | Status: SHIPPED | OUTPATIENT
Start: 2025-08-28